# Patient Record
Sex: FEMALE | Race: WHITE | NOT HISPANIC OR LATINO | Employment: FULL TIME | URBAN - METROPOLITAN AREA
[De-identification: names, ages, dates, MRNs, and addresses within clinical notes are randomized per-mention and may not be internally consistent; named-entity substitution may affect disease eponyms.]

---
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
## 2025-01-23 ENCOUNTER — HOSPITAL ENCOUNTER (EMERGENCY)
Facility: HOSPITAL | Age: 20
Discharge: HOME | End: 2025-01-23
Payer: COMMERCIAL

## 2025-01-23 ENCOUNTER — APPOINTMENT (OUTPATIENT)
Dept: CT IMAGING | Facility: HOSPITAL | Age: 20
End: 2025-01-23
Payer: COMMERCIAL

## 2025-01-23 VITALS
SYSTOLIC BLOOD PRESSURE: 113 MMHG | DIASTOLIC BLOOD PRESSURE: 56 MMHG | TEMPERATURE: 98.3 F | OXYGEN SATURATION: 98 % | HEART RATE: 65 BPM | RESPIRATION RATE: 18 BRPM | WEIGHT: 170.3 LBS | BODY MASS INDEX: 29.08 KG/M2 | HEIGHT: 64 IN

## 2025-01-23 DIAGNOSIS — N12 PYELONEPHRITIS: ICD-10-CM

## 2025-01-23 LAB
ALBUMIN SERPL BCG-MCNC: 4.3 G/DL (ref 3.5–5.2)
ALBUMIN UR-MCNC: 20 MG/DL
ALP SERPL-CCNC: 110 U/L (ref 40–150)
ALT SERPL W P-5'-P-CCNC: 17 U/L (ref 0–50)
ANION GAP SERPL CALCULATED.3IONS-SCNC: 10 MMOL/L (ref 7–15)
APPEARANCE UR: CLEAR
AST SERPL W P-5'-P-CCNC: 18 U/L (ref 0–35)
BILIRUB SERPL-MCNC: 0.8 MG/DL
BILIRUB UR QL STRIP: NEGATIVE
BUN SERPL-MCNC: 11.8 MG/DL (ref 6–20)
CALCIUM SERPL-MCNC: 9.8 MG/DL (ref 8.8–10.4)
CHLORIDE SERPL-SCNC: 105 MMOL/L (ref 98–107)
COLOR UR AUTO: YELLOW
CREAT SERPL-MCNC: 0.85 MG/DL (ref 0.51–0.95)
EGFRCR SERPLBLD CKD-EPI 2021: >90 ML/MIN/1.73M2
ERYTHROCYTE [DISTWIDTH] IN BLOOD BY AUTOMATED COUNT: 13.2 % (ref 10–15)
GLUCOSE SERPL-MCNC: 95 MG/DL (ref 70–99)
GLUCOSE UR STRIP-MCNC: NEGATIVE MG/DL
HCG SERPL QL: NEGATIVE
HCO3 SERPL-SCNC: 24 MMOL/L (ref 22–29)
HCT VFR BLD AUTO: 40.3 % (ref 35–47)
HGB BLD-MCNC: 13.1 G/DL (ref 11.7–15.7)
HGB UR QL STRIP: NEGATIVE
HOLD SPECIMEN: NORMAL
KETONES UR STRIP-MCNC: NEGATIVE MG/DL
LEUKOCYTE ESTERASE UR QL STRIP: ABNORMAL
MCH RBC QN AUTO: 28.1 PG (ref 26.5–33)
MCHC RBC AUTO-ENTMCNC: 32.5 G/DL (ref 31.5–36.5)
MCV RBC AUTO: 87 FL (ref 78–100)
NITRATE UR QL: NEGATIVE
PH UR STRIP: 6.5 [PH] (ref 5–7)
PLATELET # BLD AUTO: 304 10E3/UL (ref 150–450)
POTASSIUM SERPL-SCNC: 3.9 MMOL/L (ref 3.4–5.3)
PROT SERPL-MCNC: 7.9 G/DL (ref 6.4–8.3)
RBC # BLD AUTO: 4.66 10E6/UL (ref 3.8–5.2)
RBC URINE: 1 /HPF
SODIUM SERPL-SCNC: 139 MMOL/L (ref 135–145)
SP GR UR STRIP: 1.03 (ref 1–1.03)
SQUAMOUS EPITHELIAL: 7 /HPF
UROBILINOGEN UR STRIP-MCNC: <2 MG/DL
WBC # BLD AUTO: 8.3 10E3/UL (ref 4–11)
WBC URINE: 4 /HPF

## 2025-01-23 PROCEDURE — 82435 ASSAY OF BLOOD CHLORIDE: CPT

## 2025-01-23 PROCEDURE — 36415 COLL VENOUS BLD VENIPUNCTURE: CPT | Performed by: STUDENT IN AN ORGANIZED HEALTH CARE EDUCATION/TRAINING PROGRAM

## 2025-01-23 PROCEDURE — 84155 ASSAY OF PROTEIN SERUM: CPT

## 2025-01-23 PROCEDURE — 250N000011 HC RX IP 250 OP 636

## 2025-01-23 PROCEDURE — 96375 TX/PRO/DX INJ NEW DRUG ADDON: CPT

## 2025-01-23 PROCEDURE — 82040 ASSAY OF SERUM ALBUMIN: CPT

## 2025-01-23 PROCEDURE — 96374 THER/PROPH/DIAG INJ IV PUSH: CPT | Mod: 59

## 2025-01-23 PROCEDURE — 84703 CHORIONIC GONADOTROPIN ASSAY: CPT

## 2025-01-23 PROCEDURE — 99285 EMERGENCY DEPT VISIT HI MDM: CPT | Mod: 25

## 2025-01-23 PROCEDURE — 74177 CT ABD & PELVIS W/CONTRAST: CPT

## 2025-01-23 PROCEDURE — 81001 URINALYSIS AUTO W/SCOPE: CPT

## 2025-01-23 PROCEDURE — 85014 HEMATOCRIT: CPT

## 2025-01-23 PROCEDURE — 85041 AUTOMATED RBC COUNT: CPT

## 2025-01-23 RX ORDER — IOPAMIDOL 755 MG/ML
83 INJECTION, SOLUTION INTRAVASCULAR ONCE
Status: COMPLETED | OUTPATIENT
Start: 2025-01-23 | End: 2025-01-23

## 2025-01-23 RX ORDER — ONDANSETRON 2 MG/ML
4 INJECTION INTRAMUSCULAR; INTRAVENOUS ONCE
Status: COMPLETED | OUTPATIENT
Start: 2025-01-23 | End: 2025-01-23

## 2025-01-23 RX ORDER — CEFDINIR 300 MG/1
300 CAPSULE ORAL 2 TIMES DAILY
Qty: 14 CAPSULE | Refills: 0 | Status: SHIPPED | OUTPATIENT
Start: 2025-01-23 | End: 2025-01-30

## 2025-01-23 RX ORDER — ONDANSETRON 4 MG/1
4 TABLET, ORALLY DISINTEGRATING ORAL EVERY 6 HOURS PRN
Qty: 12 TABLET | Refills: 0 | Status: SHIPPED | OUTPATIENT
Start: 2025-01-23

## 2025-01-23 RX ORDER — KETOROLAC TROMETHAMINE 15 MG/ML
15 INJECTION, SOLUTION INTRAMUSCULAR; INTRAVENOUS ONCE
Status: COMPLETED | OUTPATIENT
Start: 2025-01-23 | End: 2025-01-23

## 2025-01-23 RX ADMIN — ONDANSETRON 4 MG: 2 INJECTION INTRAMUSCULAR; INTRAVENOUS at 10:25

## 2025-01-23 RX ADMIN — IOPAMIDOL 83 ML: 755 INJECTION, SOLUTION INTRAVENOUS at 12:15

## 2025-01-23 RX ADMIN — KETOROLAC TROMETHAMINE 15 MG: 15 INJECTION, SOLUTION INTRAMUSCULAR; INTRAVENOUS at 10:26

## 2025-01-23 ASSESSMENT — COLUMBIA-SUICIDE SEVERITY RATING SCALE - C-SSRS
2. HAVE YOU ACTUALLY HAD ANY THOUGHTS OF KILLING YOURSELF IN THE PAST MONTH?: NO
1. IN THE PAST MONTH, HAVE YOU WISHED YOU WERE DEAD OR WISHED YOU COULD GO TO SLEEP AND NOT WAKE UP?: NO
6. HAVE YOU EVER DONE ANYTHING, STARTED TO DO ANYTHING, OR PREPARED TO DO ANYTHING TO END YOUR LIFE?: YES

## 2025-01-23 ASSESSMENT — ACTIVITIES OF DAILY LIVING (ADL)
ADLS_ACUITY_SCORE: 43

## 2025-01-23 NOTE — ED TRIAGE NOTES
Patient ambulates to triage with boyfriend.  Diagnosed with a UTI on Monday and has been taking abx as prescribed.  Still continues to have bilateral flank pain with lower abdominal pain.  Still noting some blood in the urine.      Not taking anything at home for the pain.

## 2025-01-23 NOTE — ED PROVIDER NOTES
Emergency Department Encounter   NAME: Stewart Moreira  AGE: 19 year old female  YOB: 2005  MRN: [id number]    PCP: No Ref-Primary, Physician  ED PROVIDER: Gladis Huggins PA-C    Evaluation Date & Time:   1/23/2025  9:42 AM    CHIEF COMPLAINT:  Abdominal Pain and Flank Pain      Impression and Plan   MDM: 19-year-old female with no pertinent history presents for evaluation of abdominal and flank pain.  Has been ongoing for 4 days.  Diagnosed with UTI on 1/20/2025 and started on Macrobid which she has been taking.  Nauseous but no vomiting.  No fevers.  On arrival here patient is vitally stable.  Afebrile.  On my exam patient is in no acute distress.  She has diffuse lower abdominal tenderness to palpation of bilateral CVA tenderness.  Differential includes appendicitis, pyelonephritis, ureteral stone.  We discussed plans for labs, CT, Toradol/Zofran for symptoms at this time which patient is agreeable to.    On chart review:   Negative pregnancy test 1/20/2025.  UA 1/20/2025 positive for leukocyte esterase, nitrates, blood, many bacteria -samples also contaminated. -- no culture available.    Lab work here is very reassuring without any leukocytosis or anemia.  No concerning electrolyte abnormality.  No ROBYN.  No LFT abnormality.  Negative pregnancy test.  Repeat UA here is not overly convincing for infection, but she has been on Macrobid.  CT with findings consistent with cystitis.  No stone or CT findings of pyelonephritis.  I reassessed patient she is feeling much better after Toradol and Zofran.  Discussed reassuring workup.  Though given her persistent nausea and flank pain we discussed discontinuing her Macrobid and starting Omnicef to cover for possible developing pyelonephritis.  Patient is agreeable to this plan.  I prescribed her Zofran and Omnicef.  She is going to follow-up with her primary care provider.  We reviewed strict return precautions, signs of worsening infection, and patient was  discharged home in stable condition.         Medical Decision Making  Obtained supplemental history:Supplemental history obtained?: Family Member/Significant Other  Reviewed external records: External records reviewed?: Documented in chart  Care impacted by chronic illness:Documented in Chart  Did you consider but not order tests?: Work up considered but not performed and documented in chart, if applicable  Did you interpret images independently?: Independent interpretation of ECG and images noted in documentation, when applicable.  Consultation discussion with other provider:Did you involve another provider (consultant, , pharmacy, etc.)?: No  Discharge. I prescribed additional prescription strength medication(s) as charted. N/A.    MIPS: Not Applicable        FINAL IMPRESSION:    ICD-10-CM    1. Pyelonephritis  N12             MEDICATIONS GIVEN IN THE EMERGENCY DEPARTMENT:  Medications   ondansetron (ZOFRAN) injection 4 mg (4 mg Intravenous $Given 1/23/25 1025)   ketorolac (TORADOL) injection 15 mg (15 mg Intravenous $Given 1/23/25 1026)   iopamidol (ISOVUE-370) solution 83 mL (83 mLs Intravenous $Given 1/23/25 1215)         NEW PRESCRIPTIONS STARTED AT TODAY'S ED VISIT:  Discharge Medication List as of 1/23/2025  1:13 PM        START taking these medications    Details   cefdinir (OMNICEF) 300 MG capsule Take 1 capsule (300 mg) by mouth 2 times daily for 7 days., Disp-14 capsule, R-0, E-Prescribe      ondansetron (ZOFRAN ODT) 4 MG ODT tab Take 1 tablet (4 mg) by mouth every 6 hours as needed for nausea or vomiting., Disp-12 tablet, R-0, E-Prescribe               HPI   Patient information was obtained from: patient   Use of Intrepreter: N/A     Stewart Moreira is a 19 year old female with a pertinent history of MDD & DARIA who presents to the ED by car for evaluation of abdominal pain and flank pain.  Patient was seen by primary care 4 days ago and diagnosed with UTI.  She was started on Macrobid which she has  "been taking since then.  She notes continued pink-tinged urine, dysuria, suprapubic pain, and bilateral flank pain.  She endorses nausea but no vomiting.  No diarrhea, black/bloody stools, fever.  LMP was 2 weeks ago.      REVIEW OF SYSTEMS:  Pertinent positive and negative symptoms per HPI.       Physical Exam     First Vitals:  Patient Vitals for the past 24 hrs:   BP Temp Temp src Pulse Resp SpO2 Height Weight   01/23/25 1157 -- -- -- 65 -- 98 % -- --   01/23/25 1156 -- -- -- 67 -- 98 % -- --   01/23/25 1155 -- -- -- 69 -- 98 % -- --   01/23/25 1154 -- -- -- 67 -- 96 % -- --   01/23/25 1153 -- -- -- 65 -- 98 % -- --   01/23/25 1152 -- -- -- 70 -- 98 % -- --   01/23/25 1151 -- -- -- 65 -- 97 % -- --   01/23/25 1150 113/56 -- -- 67 -- 98 % -- --   01/23/25 1028 -- -- -- 80 -- 99 % -- --   01/23/25 1027 -- -- -- 75 -- 95 % -- --   01/23/25 1026 -- -- -- 73 -- 100 % -- --   01/23/25 1025 -- -- -- 78 -- 99 % -- --   01/23/25 1024 -- -- -- 74 -- 100 % -- --   01/23/25 1023 -- -- -- 78 -- 99 % -- --   01/23/25 1022 -- -- -- 78 -- 99 % -- --   01/23/25 1021 -- -- -- 79 -- 99 % -- --   01/23/25 1020 -- -- -- 83 -- 99 % -- --   01/23/25 1019 -- -- -- 81 -- 100 % -- --   01/23/25 1018 -- -- -- 78 -- 99 % -- --   01/23/25 1017 -- -- -- 78 -- 100 % -- --   01/23/25 1016 -- -- -- 82 -- 99 % -- --   01/23/25 1015 116/69 -- -- 84 -- 99 % -- --   01/23/25 0947 -- -- -- 86 -- 98 % -- --   01/23/25 0946 115/72 -- -- 91 -- 98 % -- --   01/23/25 0938 132/77 98.3  F (36.8  C) Oral 97 18 97 % 1.626 m (5' 4\") 77.2 kg (170 lb 4.8 oz)       PHYSICAL EXAM:   General Appearance:  Alert, cooperative, no distress, appears stated age  HENT: Normocephalic without obvious deformity, atraumatic. Mucous membranes moist   Eyes: Conjunctiva clear, Lids normal. No discharge.   Respiratory: No distress. Lungs clear to ausculation bilaterally. No wheezes, rhonchi or stridor  Cardiovascular: Regular rate and rhythm, no murmur. Normal cap refill. No " peripheral edema  GI: Suprapubic, right lower quadrant, left lower quadrant tenderness to palpation without any associated rigidity, rebound, guarding, or distention.  Otherwise abdomen is soft and nontender.  : Bilateral CVA tenderness  Musculoskeletal: Moving all extremities. No gross deformities  Integument: Warm, dry, no rashes or lesions  Neurologic: Alert and orientated x3.   Psych: Normal mood and affect      Results     LAB:  All pertinent labs reviewed and interpreted  Labs Ordered and Resulted from Time of ED Arrival to Time of ED Departure   ROUTINE UA WITH MICROSCOPIC REFLEX TO CULTURE - Abnormal       Result Value    Color Urine Yellow      Appearance Urine Clear      Glucose Urine Negative      Bilirubin Urine Negative      Ketones Urine Negative      Specific Gravity Urine 1.031 (*)     Blood Urine Negative      pH Urine 6.5      Protein Albumin Urine 20 (*)     Urobilinogen Urine <2.0      Nitrite Urine Negative      Leukocyte Esterase Urine 25 Fadi/uL (*)     RBC Urine 1      WBC Urine 4      Squamous Epithelials Urine 7 (*)    CBC WITH PLATELETS - Normal    WBC Count 8.3      RBC Count 4.66      Hemoglobin 13.1      Hematocrit 40.3      MCV 87      MCH 28.1      MCHC 32.5      RDW 13.2      Platelet Count 304     COMPREHENSIVE METABOLIC PANEL - Normal    Sodium 139      Potassium 3.9      Carbon Dioxide (CO2) 24      Anion Gap 10      Urea Nitrogen 11.8      Creatinine 0.85      GFR Estimate >90      Calcium 9.8      Chloride 105      Glucose 95      Alkaline Phosphatase 110      AST 18      ALT 17      Protein Total 7.9      Albumin 4.3      Bilirubin Total 0.8     HCG QUALITATIVE PREGNANCY - Normal    hCG Serum Qualitative Negative         RADIOLOGY:  CT Abdomen Pelvis w Contrast   Final Result   IMPRESSION:    1.  The bladder is incompletely distended accentuating wall thickening. Superimposed cystitis is also the differential.   2.  Otherwise, no additional findings to explain symptoms. No  hydronephrosis or CT evidence of pyelonephritis.        .       Gladis Huggins PA-C   Emergency Medicine   [facility] EMERGENCY DEPARTMENT       Gladis Huggins PA-C  01/23/25 1483

## 2025-01-23 NOTE — DISCHARGE INSTRUCTIONS
You were seen in the emergency department for evaluation of urinary symptoms and flank pain.  Thankfully there is no kidney stone or appendicitis on your CT scan.  I am concerned that your urine infection may be traveling towards her kidneys, but thankfully there is no evidence of kidney infection on your CT scan today.    Please stop taking the antibiotics that you were prescribed by your primary care provider called Macrobid. START taking cefdinir, Omnicef, as prescribed 2 times a day for 7 days.  Use the Zofran as needed for nausea and vomiting.  Use Tylenol and ibuprofen as needed for discomfort.    You may take 600 mg of ibuprofen (Advil) every 6 hours and 650 mg acetaminophen (Tylenol) every 6 hours for pain. Do not take more than 3200 mg of ibuprofen (Advil) in 24 hours. Do not take more than 3000 mg of acetaminophen (Tylenol) in 24 hours. You may take this much for 1-3 days, then only use as needed.     Schedule follow-up appointment your primary care provider.    Return to the emergency department for uncontrollable pain, uncontrollable vomiting, fever, or any other concerning symptoms.  
detailed exam

## 2025-02-08 ENCOUNTER — HOSPITAL ENCOUNTER (EMERGENCY)
Facility: HOSPITAL | Age: 20
Discharge: HOME | End: 2025-02-08
Attending: EMERGENCY MEDICINE | Admitting: EMERGENCY MEDICINE
Payer: COMMERCIAL

## 2025-02-08 VITALS
RESPIRATION RATE: 16 BRPM | HEIGHT: 64 IN | SYSTOLIC BLOOD PRESSURE: 121 MMHG | HEART RATE: 66 BPM | WEIGHT: 173.3 LBS | OXYGEN SATURATION: 100 % | DIASTOLIC BLOOD PRESSURE: 73 MMHG | TEMPERATURE: 98.1 F | BODY MASS INDEX: 29.59 KG/M2

## 2025-02-08 DIAGNOSIS — S06.0X1A CONCUSSION WITH LOSS OF CONSCIOUSNESS OF 30 MINUTES OR LESS, INITIAL ENCOUNTER: ICD-10-CM

## 2025-02-08 PROCEDURE — 99282 EMERGENCY DEPT VISIT SF MDM: CPT

## 2025-02-08 ASSESSMENT — ACTIVITIES OF DAILY LIVING (ADL): ADLS_ACUITY_SCORE: 41

## 2025-02-08 ASSESSMENT — COLUMBIA-SUICIDE SEVERITY RATING SCALE - C-SSRS
4. HAVE YOU HAD THESE THOUGHTS AND HAD SOME INTENTION OF ACTING ON THEM?: NO
6. HAVE YOU EVER DONE ANYTHING, STARTED TO DO ANYTHING, OR PREPARED TO DO ANYTHING TO END YOUR LIFE?: YES
3. HAVE YOU BEEN THINKING ABOUT HOW YOU MIGHT KILL YOURSELF?: NO
2. HAVE YOU ACTUALLY HAD ANY THOUGHTS OF KILLING YOURSELF IN THE PAST MONTH?: YES
1. IN THE PAST MONTH, HAVE YOU WISHED YOU WERE DEAD OR WISHED YOU COULD GO TO SLEEP AND NOT WAKE UP?: YES
5. HAVE YOU STARTED TO WORK OUT OR WORKED OUT THE DETAILS OF HOW TO KILL YOURSELF? DO YOU INTEND TO CARRY OUT THIS PLAN?: NO

## 2025-02-08 NOTE — Clinical Note
Stewart Gottlieblamar was seen and treated in our emergency department on 2/8/2025.  She may return to work on 02/13/2025.       If you have any questions or concerns, please don't hesitate to call.      Nancy Olivares MD

## 2025-02-09 NOTE — ED TRIAGE NOTES
Patient reports that around noon today she was messing around with a friend and hit the left side of her face on her friends head. Patient has a small bruise on her left eyelid. Patient reports pain 3 out of 10 and did not take anything for pain. She also reports feeling tired and dizzy. Alert and oriented x 4. Patient also reports that she has had thoughts of wishing she was dead and thought os suicide but has no plan. Patient reports she does not have plan on hurting herself and does have an outpatient therapist. She does not feel she nneds mental health treatment at this time. Provider updated.      Triage Assessment (Adult)       Row Name 02/08/25 1941          Triage Assessment    Airway WDL WDL        Respiratory WDL    Respiratory WDL WDL        Peripheral/Neurovascular WDL    Peripheral Neurovascular WDL WDL        Cognitive/Neuro/Behavioral WDL    Cognitive/Neuro/Behavioral WDL X        Pupils (CN II)    Pupil PERRLA yes     Pupil Size Left 3 mm     Pupil Size Right 3 mm        Beason Coma Scale    Best Eye Response 4-->(E4) spontaneous     Best Motor Response 6-->(M6) obeys commands     Best Verbal Response 5-->(V5) oriented     Johanna Coma Scale Score 15

## 2025-02-09 NOTE — DISCHARGE INSTRUCTIONS
You were seen in the Emergency Department today for evaluation of a concussion.  Take Tylenol and ibuprofen as needed for symptoms.  Follow up with your primary care physician to ensure resolution of symptoms. Return if you have new or worsening symptoms.

## 2025-02-09 NOTE — ED PROVIDER NOTES
EMERGENCY DEPARTMENT ENCOUNTER      NAME: Stewart Moreira  AGE: 19 year old female  YOB: 2005  MRN: [id number]  EVALUATION DATE & TIME: 2/8/2025  7:47 PM    PCP: No Ref-Primary, Physician    ED PROVIDER: Nancy Olivares M.D.      Chief Complaint   Patient presents with    Head Injury         FINAL IMPRESSION:  1. Concussion with loss of consciousness of 30 minutes or less, initial encounter        ED COURSE & MEDICAL DECISION MAKING:    Pertinent Labs & Imaging studies reviewed. (See chart for details)  ED Course as of 02/08/25 2006   Sat Feb 08, 2025 2003 Patient is a pleasant 19-year-old female who comes in today for evaluation after she bumped heads with her friend earlier today around 12:30 PM.  She said they were roughhousing and they hit heads.  She was hit over the left brow.  She said that she may have had immediate loss of consciousness.  Now she is feeling little dizzy and tired but no nausea or vomiting.  She has had concussions before but never lost consciousness.  She is normally pretty healthy.  She does have a small amount of swelling over her left brow with no laceration.  She has no neck tenderness.  She has no other areas of swelling.  She has no hemotympanums.  Neuroexam is normal with normal finger-to-nose and normal extraocular movements as well has normal pupillary response.  Sensation and motor intact bilaterally.  She is not on any blood thinners.  I think she has a concussion.  I do not think her symptoms at this point warrant CT imaging.  I discussed all this with her and she is in agreement with the plan.  There was some concern about some suicidal ideation but I discussed this with her and she has good resources as an outpatient and does not have any concern about her safety right now.  We will discharge her home with concussion precautions.       Medical Decision Making    History:  Supplemental history from: None  External Record(s) reviewed: I reviewed patient's clinic  visit from 1/20/2025.  At that time she was being seen for frequent urination and cloudy urine for 2 days.  Urinalysis came back nitrite positive and leukocyte esterase positive and patient was treated with nitrofurantoin.    Work Up:  Emergent/Severe conditions considered and evaluated for: Concussion, intracranial hemorrhage  I independently reviewed and interpreted none.   In addition to work up documented, I considered the following work up: Considered head CT but per [place] CT head rules patient does not meet criteria given that she has no blood thinners, no seizure, normal GCS of 15, no depressed skull fracture, no signs of a basilar skull fracture, no vomiting, age 19, nondangerous mechanism, and no retrograde amnesia to the event of greater than 30 minutes.  Medications given that require intensive monitoring for toxicity: None    External consultation:  Discussion of management with another provider: None    Complicating factors:  Care impacted by chronic illness: None    Disposition considerations: Discharge  Prescriptions considered/prescribed: None        At the conclusion of the encounter I discussed  the results of all of the tests and the disposition with patient.   All questions were answered.  The patient acknowledged understanding and was involved in the decision making regarding the overall care plan.      I discussed with patient the utility, limitations and findings of the exam/interventions/studies done during this visit as well as the list of differential diagnosis and symptoms to monitor/return to ER for.  Additional verbal discharge instructions were provided.     MEDICATIONS GIVEN IN THE EMERGENCY:  Medications - No data to display    NEW PRESCRIPTIONS STARTED AT TODAY'S ER VISIT  New Prescriptions    No medications on file          =================================================================    HPI    Triage Note: Patient reports that around noon today she was messing around with a  friend and hit the left side of her face on her friends head. Patient has a small bruise on her left eyelid. Patient reports pain 3 out of 10 and did not take anything for pain. She also reports feeling tired and dizzy. Alert and oriented x 4. Patient also reports that she has had thoughts of wishing she was dead and thought os suicide but has no plan. Patient reports she does not have plan on hurting herself and does have an outpatient therapist. She does not feel she nneds mental health treatment at this time. Provider updated.      Triage Assessment (Adult)       Row Name 02/08/25 1941          Triage Assessment    Airway WDL WDL        Respiratory WDL    Respiratory WDL WDL        Peripheral/Neurovascular WDL    Peripheral Neurovascular WDL WDL        Cognitive/Neuro/Behavioral WDL    Cognitive/Neuro/Behavioral WDL X        Pupils (CN II)    Pupil PERRLA yes     Pupil Size Left 3 mm     Pupil Size Right 3 mm        Johanna Coma Scale    Best Eye Response 4-->(E4) spontaneous     Best Motor Response 6-->(M6) obeys commands     Best Verbal Response 5-->(V5) oriented     Johanna Coma Scale Score 15                   Use of : None      Stewart Moreiar is a 19 year old female who presents for evaluation of headache and some dizziness after she bumped heads with a friend about 7-1/2 hours prior to arrival.    PAST MEDICAL HISTORY:  No past medical history on file.    PAST SURGICAL HISTORY:  No past surgical history on file.    CURRENT MEDICATIONS:    No current facility-administered medications for this encounter.    Current Outpatient Medications:     ondansetron (ZOFRAN ODT) 4 MG ODT tab, Take 1 tablet (4 mg) by mouth every 6 hours as needed for nausea or vomiting., Disp: 12 tablet, Rfl: 0    ALLERGIES:  No Known Allergies    FAMILY HISTORY:  No family history on file.    SOCIAL HISTORY:   Social History     Socioeconomic History    Marital status: Single     Social Drivers of Health     Food Insecurity:  "No Food Insecurity (10/4/2024)    Received from [facility]    Hunger Vital Sign     Worried About Running Out of Food in the Last Year: Never true     Ran Out of Food in the Last Year: Never true   Transportation Needs: No Transportation Needs (10/4/2024)    [facility]     In the past 12 months, has lack of transportation kept you from medical appointments or from getting medications?: No     In the past 12 months, has lack of transportation kept you from meetings, work, or from getting things needed for daily living?: No   Housing Stability: Low Risk  (10/4/2024)    Received from [facility]    Housing Stability Vital Sign     Unable to Pay for Housing in the Last Year: No     Number of Times Moved in the Last Year: 0     Homeless in the Last Year: No       PHYSICAL EXAM    VITAL SIGNS: /73   Pulse 66   Temp 98.1  F (36.7  C) (Oral)   Resp 16   Ht 1.626 m (5' 4\")   Wt 78.6 kg (173 lb 4.8 oz)   LMP 01/13/2025 (Approximate)   SpO2 100%   BMI 29.75 kg/m     GENERAL: Awake, alert, answering questions appropriately, no acute distress, small amount of swelling to the left brow without laceration.  No step-off to the orbit, pupils equally round reactive to light.  Extraocular movements intact with no difficulty with tracking.  No signs of entrapment of intraocular muscles, no palpable hematoma to the rest of the scalp.  No hemotympanum.  No cervical spine tenderness.  Equal  strength bilaterally.  Finger-nose test normal bilaterally.  Sensation to upper and lower extremities equal bilaterally.  Patient able to stand up without difficulty.  Able to close eyes and hold there for 5 seconds without difficulty.  No facial droop.  GCS 15  SPEECH:  Easy to understand speech, Normal volume and rafa  PULMONARY: No respiratory distress  CARDIOVASCULAR: Regular rate and rhythm, Distal pulses present and normal.  EXTREMITIES: No lower extremity edema.  PSYCH: " Normal mood and affect     Nancy Olivares M.D.  Emergency Medicine  [facility] EMERGENCY DEPARTMENT  [facility] [facility] [address] [phone]  Dept: [phone]       Nancy Olivares MD  02/08/25 2010

## 2025-03-04 ENCOUNTER — HOSPITAL ENCOUNTER (EMERGENCY)
Facility: HOSPITAL | Age: 20
Discharge: HOME | End: 2025-03-04
Attending: STUDENT IN AN ORGANIZED HEALTH CARE EDUCATION/TRAINING PROGRAM | Admitting: STUDENT IN AN ORGANIZED HEALTH CARE EDUCATION/TRAINING PROGRAM
Payer: COMMERCIAL

## 2025-03-04 VITALS
SYSTOLIC BLOOD PRESSURE: 108 MMHG | RESPIRATION RATE: 14 BRPM | OXYGEN SATURATION: 100 % | HEART RATE: 80 BPM | HEIGHT: 64 IN | WEIGHT: 170 LBS | TEMPERATURE: 98.4 F | DIASTOLIC BLOOD PRESSURE: 56 MMHG | BODY MASS INDEX: 29.02 KG/M2

## 2025-03-04 DIAGNOSIS — R45.851 SUICIDAL THOUGHTS: ICD-10-CM

## 2025-03-04 DIAGNOSIS — R44.0 AUDITORY HALLUCINATIONS: ICD-10-CM

## 2025-03-04 DIAGNOSIS — R51.9 ACUTE NONINTRACTABLE HEADACHE, UNSPECIFIED HEADACHE TYPE: ICD-10-CM

## 2025-03-04 PROBLEM — F41.1 GAD (GENERALIZED ANXIETY DISORDER): Status: ACTIVE | Noted: 2024-07-02

## 2025-03-04 PROBLEM — F32.3 MAJOR DEPRESSIVE DISORDER, SINGLE EPISODE, SEVERE WITH PSYCHOTIC FEATURES (H): Status: ACTIVE | Noted: 2024-07-02

## 2025-03-04 LAB
ANION GAP SERPL CALCULATED.3IONS-SCNC: 9 MMOL/L (ref 7–15)
BASOPHILS # BLD AUTO: 0.1 10E3/UL (ref 0–0.2)
BASOPHILS NFR BLD AUTO: 1 %
BUN SERPL-MCNC: 12 MG/DL (ref 6–20)
CALCIUM SERPL-MCNC: 9.7 MG/DL (ref 8.8–10.4)
CHLORIDE SERPL-SCNC: 105 MMOL/L (ref 98–107)
CREAT SERPL-MCNC: 0.91 MG/DL (ref 0.51–0.95)
EGFRCR SERPLBLD CKD-EPI 2021: >90 ML/MIN/1.73M2
EOSINOPHIL # BLD AUTO: 0.1 10E3/UL (ref 0–0.7)
EOSINOPHIL NFR BLD AUTO: 2 %
ERYTHROCYTE [DISTWIDTH] IN BLOOD BY AUTOMATED COUNT: 12.5 % (ref 10–15)
ETHANOL SERPL-MCNC: <0.01 G/DL
GLUCOSE SERPL-MCNC: 99 MG/DL (ref 70–99)
HCO3 SERPL-SCNC: 24 MMOL/L (ref 22–29)
HCT VFR BLD AUTO: 40.4 % (ref 35–47)
HGB BLD-MCNC: 13.3 G/DL (ref 11.7–15.7)
IMM GRANULOCYTES # BLD: 0 10E3/UL
IMM GRANULOCYTES NFR BLD: 0 %
LYMPHOCYTES # BLD AUTO: 1.8 10E3/UL (ref 0.8–5.3)
LYMPHOCYTES NFR BLD AUTO: 25 %
MCH RBC QN AUTO: 27.8 PG (ref 26.5–33)
MCHC RBC AUTO-ENTMCNC: 32.9 G/DL (ref 31.5–36.5)
MCV RBC AUTO: 85 FL (ref 78–100)
MONOCYTES # BLD AUTO: 0.3 10E3/UL (ref 0–1.3)
MONOCYTES NFR BLD AUTO: 5 %
NEUTROPHILS # BLD AUTO: 4.8 10E3/UL (ref 1.6–8.3)
NEUTROPHILS NFR BLD AUTO: 67 %
NRBC # BLD AUTO: 0 10E3/UL
NRBC BLD AUTO-RTO: 0 /100
PLATELET # BLD AUTO: 284 10E3/UL (ref 150–450)
POTASSIUM SERPL-SCNC: 3.6 MMOL/L (ref 3.4–5.3)
RBC # BLD AUTO: 4.78 10E6/UL (ref 3.8–5.2)
SARS-COV-2 RNA RESP QL NAA+PROBE: NEGATIVE
SODIUM SERPL-SCNC: 138 MMOL/L (ref 135–145)
WBC # BLD AUTO: 7.1 10E3/UL (ref 4–11)

## 2025-03-04 PROCEDURE — 99285 EMERGENCY DEPT VISIT HI MDM: CPT | Mod: 25

## 2025-03-04 PROCEDURE — 82565 ASSAY OF CREATININE: CPT | Performed by: STUDENT IN AN ORGANIZED HEALTH CARE EDUCATION/TRAINING PROGRAM

## 2025-03-04 PROCEDURE — 85048 AUTOMATED LEUKOCYTE COUNT: CPT | Performed by: STUDENT IN AN ORGANIZED HEALTH CARE EDUCATION/TRAINING PROGRAM

## 2025-03-04 PROCEDURE — 85004 AUTOMATED DIFF WBC COUNT: CPT | Performed by: STUDENT IN AN ORGANIZED HEALTH CARE EDUCATION/TRAINING PROGRAM

## 2025-03-04 PROCEDURE — 250N000013 HC RX MED GY IP 250 OP 250 PS 637: Performed by: STUDENT IN AN ORGANIZED HEALTH CARE EDUCATION/TRAINING PROGRAM

## 2025-03-04 PROCEDURE — 82077 ASSAY SPEC XCP UR&BREATH IA: CPT | Performed by: STUDENT IN AN ORGANIZED HEALTH CARE EDUCATION/TRAINING PROGRAM

## 2025-03-04 PROCEDURE — 258N000003 HC RX IP 258 OP 636: Performed by: STUDENT IN AN ORGANIZED HEALTH CARE EDUCATION/TRAINING PROGRAM

## 2025-03-04 PROCEDURE — 36415 COLL VENOUS BLD VENIPUNCTURE: CPT | Performed by: STUDENT IN AN ORGANIZED HEALTH CARE EDUCATION/TRAINING PROGRAM

## 2025-03-04 PROCEDURE — 96375 TX/PRO/DX INJ NEW DRUG ADDON: CPT

## 2025-03-04 PROCEDURE — 96361 HYDRATE IV INFUSION ADD-ON: CPT

## 2025-03-04 PROCEDURE — 87635 SARS-COV-2 COVID-19 AMP PRB: CPT | Performed by: STUDENT IN AN ORGANIZED HEALTH CARE EDUCATION/TRAINING PROGRAM

## 2025-03-04 PROCEDURE — 80048 BASIC METABOLIC PNL TOTAL CA: CPT | Performed by: STUDENT IN AN ORGANIZED HEALTH CARE EDUCATION/TRAINING PROGRAM

## 2025-03-04 PROCEDURE — 96374 THER/PROPH/DIAG INJ IV PUSH: CPT

## 2025-03-04 PROCEDURE — 250N000011 HC RX IP 250 OP 636: Performed by: STUDENT IN AN ORGANIZED HEALTH CARE EDUCATION/TRAINING PROGRAM

## 2025-03-04 RX ORDER — SERTRALINE HYDROCHLORIDE 25 MG/1
25 TABLET, FILM COATED ORAL DAILY
Status: DISCONTINUED | OUTPATIENT
Start: 2025-03-04 | End: 2025-03-04

## 2025-03-04 RX ORDER — METOCLOPRAMIDE HYDROCHLORIDE 5 MG/ML
10 INJECTION INTRAMUSCULAR; INTRAVENOUS ONCE
Status: COMPLETED | OUTPATIENT
Start: 2025-03-04 | End: 2025-03-04

## 2025-03-04 RX ORDER — KETOROLAC TROMETHAMINE 15 MG/ML
15 INJECTION, SOLUTION INTRAMUSCULAR; INTRAVENOUS ONCE
Status: COMPLETED | OUTPATIENT
Start: 2025-03-04 | End: 2025-03-04

## 2025-03-04 RX ORDER — DIPHENHYDRAMINE HYDROCHLORIDE 50 MG/ML
50 INJECTION, SOLUTION INTRAMUSCULAR; INTRAVENOUS ONCE
Status: COMPLETED | OUTPATIENT
Start: 2025-03-04 | End: 2025-03-04

## 2025-03-04 RX ORDER — SERTRALINE HYDROCHLORIDE 25 MG/1
25 TABLET, FILM COATED ORAL AT BEDTIME
Status: DISCONTINUED | OUTPATIENT
Start: 2025-03-04 | End: 2025-03-04 | Stop reason: HOSPADM

## 2025-03-04 RX ORDER — ACETAMINOPHEN 325 MG/1
975 TABLET ORAL ONCE
Status: COMPLETED | OUTPATIENT
Start: 2025-03-04 | End: 2025-03-04

## 2025-03-04 RX ADMIN — ACETAMINOPHEN 975 MG: 325 TABLET ORAL at 06:31

## 2025-03-04 RX ADMIN — METOCLOPRAMIDE 10 MG: 5 INJECTION, SOLUTION INTRAMUSCULAR; INTRAVENOUS at 06:34

## 2025-03-04 RX ADMIN — KETOROLAC TROMETHAMINE 15 MG: 15 INJECTION, SOLUTION INTRAMUSCULAR; INTRAVENOUS at 06:32

## 2025-03-04 RX ADMIN — DIPHENHYDRAMINE HYDROCHLORIDE 50 MG: 50 INJECTION, SOLUTION INTRAMUSCULAR; INTRAVENOUS at 06:34

## 2025-03-04 RX ADMIN — SODIUM CHLORIDE 1000 ML: 0.9 INJECTION, SOLUTION INTRAVENOUS at 06:26

## 2025-03-04 ASSESSMENT — COLUMBIA-SUICIDE SEVERITY RATING SCALE - C-SSRS
6. HAVE YOU EVER DONE ANYTHING, STARTED TO DO ANYTHING, OR PREPARED TO DO ANYTHING TO END YOUR LIFE?: YES
4. HAVE YOU HAD THESE THOUGHTS AND HAD SOME INTENTION OF ACTING ON THEM?: YES
5. HAVE YOU STARTED TO WORK OUT OR WORKED OUT THE DETAILS OF HOW TO KILL YOURSELF? DO YOU INTEND TO CARRY OUT THIS PLAN?: NO
2. HAVE YOU ACTUALLY HAD ANY THOUGHTS OF KILLING YOURSELF IN THE PAST MONTH?: YES
6. HAVE YOU EVER DONE ANYTHING, STARTED TO DO ANYTHING, OR PREPARED TO DO ANYTHING TO END YOUR LIFE?: YES
BASED ON RESPONSES TO C-SSRS QS 1-6, WHAT IS THE PATIENT'S OVERALL RISK RATING FOR SUICIDE: HIGH RISK
3. HAVE YOU BEEN THINKING ABOUT HOW YOU MIGHT KILL YOURSELF?: NO
5. HAVE YOU STARTED TO WORK OUT OR WORKED OUT THE DETAILS OF HOW TO KILL YOURSELF? DO YOU INTEND TO CARRY OUT THIS PLAN?: NO
1. IN THE PAST MONTH, HAVE YOU WISHED YOU WERE DEAD OR WISHED YOU COULD GO TO SLEEP AND NOT WAKE UP?: YES
2. HAVE YOU ACTUALLY HAD ANY THOUGHTS OF KILLING YOURSELF IN THE PAST MONTH?: YES
3. HAVE YOU BEEN THINKING ABOUT HOW YOU MIGHT KILL YOURSELF?: NO
4. HAVE YOU HAD THESE THOUGHTS AND HAD SOME INTENTION OF ACTING ON THEM?: YES
1. IN THE PAST MONTH, HAVE YOU WISHED YOU WERE DEAD OR WISHED YOU COULD GO TO SLEEP AND NOT WAKE UP?: YES

## 2025-03-04 ASSESSMENT — ACTIVITIES OF DAILY LIVING (ADL)
ADLS_ACUITY_SCORE: 41

## 2025-03-04 NOTE — PLAN OF CARE
Stewart Moreira  March 4, 2025  Plan of Care Hand-off Note     Patient Recommended Care Path: discharge    Clinical Substantiation:  After therapeutic assessment, intervention, and aftercare planning by ED care team and LM and in consultation with attending provider, the patient's circumstances and mental state were appropriate for outpatient management. It is the recommendation of this clinician that pt discharge with OP MH support. Currently the pt is not presenting as an acute risk to self or others due to the following factors: Pt reports they present to ED for headache, thought to sef-harm and auditory hallucinations earlier in the morning. Pt states symptoms have improved overall and they developed a safety and would like to discharge home and follow up with their current healthcare providers.    Goals for crisis stabilization:  Pt states they would call their providers to schedule appointment for as soon as possible.    Next steps for Care Team:  Please print safety plan for pt prior to discharge.    Treatment Objectives Addressed:  rapport building, assessing safety, processing feelings, safety planning    Therapeutic Interventions:  Engaged in safety planning, Explored strategies for self-soothing., Identified and practiced coping skills.    Has a specific means been identified for suicidal.homicide actions: No    Patient coping skills attempted to reduce the crisis:  playing soccer, archery, drawing, sawing, listening to music and watching TV.    Collateral contact information:   Not available at time of assessment    Legal Status: Voluntary/Patient has signed consent for treatment                                                                           Reviewed court records: yes     Psychiatry Consult: Not currently    MICHOACANO Beltre

## 2025-03-04 NOTE — ED PROVIDER NOTES
[facility] EMERGENCY DEPARTMENT   ED Mental Health Observation - Initiation Note    Stewart Moreira was placed into observation at 6:32 AM on 3/4/2025 for Mental health crisis and Psychosis.   Patient is expected to be under observation status for a minimum of eight hours.    MD Henry Avery Jonathan, MD  03/04/25 0681

## 2025-03-04 NOTE — ED PROVIDER NOTES
EMERGENCY DEPARTMENT ENCOUNTER      NAME: Stewart Moreira  AGE: 19 year old female  YOB: 2005  MRN: [id number]  EVALUATION DATE & TIME: 3/4/2025  5:46 AM    PCP: No Ref-Primary, Physician    ED PROVIDER: Javi Figueroa MD      Chief Complaint   Patient presents with    Headache    Suicidal         FINAL IMPRESSION:  1. Suicidal thoughts    2. Auditory hallucinations    3. Acute nonintractable headache, unspecified headache type          ED COURSE & MEDICAL DECISION MAKING:    Pertinent Labs & Imaging studies reviewed. (See chart for details)  19 year old female presents to the Emergency Department for evaluation of headache, suicidal, auditory hallucinations    ED Course as of 03/04/25 0717   Tue Mar 04, 2025   0556 Patient is a 19-year-old female who presents to the emergency department with headache, self-harm thoughts, passive suicidal ideation, headache, auditory hallucinations.  She has a prior history of self-harm by cutting, prior history of mental health hospitalization for suicidality.  For the past 2 days her mood has declined.  No new adjustments in medications.  No alcohol use or drug use in the past day.  She is on sertraline 25 mg daily, and took it today.  She also endorses a headache for the past 2 days, which is intermittent, she has not taken anything for this lately.  No numbness/weakness, neck pain/stiffness, vision changes.  No visual hallucinations.  Her auditory hallucinations have been ongoing for the past 2 weeks, which she has never had before.  She states there are multiple voices, and difficult to understand what they are saying.  Given the new element of auditory hallucinations, suspect possible new diagnosis of schizophrenia, or depression with psychotic features.  Home sertraline ordered.  Migraine cocktail ordered.  Plan for medical clearance with lab work.  DEC consulted, patient placed on one-to-one, currently voluntary and holdable until DEC can assess the  patient.  Low suspicion of meningitis or encephalitis as she does not have fever, vital sign abnormality, neurologic deficits, neck stiffness/rigidity, and her auditory hallucinations can otherwise be explained by her depression.   0653 Alcohol undetectable.  No electrolyte abnormalities or kidney injury.  No leukocytosis or anemia.     Patient signed out to Dr. Vallejo with the following to do:  - f/up DEC recommendations    Medical Decision Making  Obtained supplemental history:Supplemental history obtained?: No  Reviewed external records: External records reviewed?: No  Care impacted by chronic illness:Documented in Chart and Mental Health  Care significantly affected by social determinants of health:N/A  Did you consider but not order tests?: Work up considered but not performed and documented in chart, if applicable  Did you interpret images independently?: Independent interpretation of ECG and images noted in documentation, when applicable.  Consultation discussion with other provider:Did you involve another provider (consultant, , pharmacy, etc.)?: I discussed the care with another health care provider, see documentation for details.  Admission considered. Patient was signed out to the oncoming physician, disposition pending.  Not Applicable      At the conclusion of the encounter I discussed the results of all of the tests and the disposition. The questions were answered. The patient or family acknowledged understanding and was agreeable with the care plan.     0 minutes of critical care time     MEDICATIONS GIVEN IN THE EMERGENCY:  Medications   sodium chloride 0.9% BOLUS 1,000 mL (1,000 mLs Intravenous $New Bag 3/4/25 0626)   sertraline (ZOLOFT) tablet 25 mg (has no administration in time range)   acetaminophen (TYLENOL) tablet 975 mg (975 mg Oral $Given 3/4/25 0631)   ketorolac (TORADOL) injection 15 mg (15 mg Intravenous $Given 3/4/25 0632)   metoclopramide (REGLAN) injection 10 mg (10 mg Intravenous  $Given 3/4/25 0634)   diphenhydrAMINE (BENADRYL) injection 50 mg (50 mg Intravenous $Given 3/4/25 0634)       NEW PRESCRIPTIONS STARTED AT TODAY'S ER VISIT  New Prescriptions    No medications on file          =================================================================    HPI    Patient information was obtained from: patient    Use of : N/A        Stewart Moreira is a 19 year old female with a pertinent history of depression who presents to this ED for evaluation of suicidal thoughts, self-harm thoughts, auditory hallucinations, headache.  For the past 2 days the patient states her mood has declined, despite her consistent use of 25 mg sertraline daily.  Over that time she has felt passive suicidal thoughts, no active plan.  She has also felt a stronger urge to self-harm by cutting, but has not done this currently, but she does have a history of doing this.  She also notes an intermittent headache for the past 2 days, mild, resolved spontaneously.  No medications used for that this morning.  No blurry vision, numbness/weakness.  Recent head trauma includes a concussion on 2/8.  No neck stiffness, fever.  For the past 2 weeks she has noted auditory hallucinations consisting of many voices, which she has a difficult time discerning what is being said.  She feels distracted by this.  She has never had auditory hallucinations with her mental illness before.  She denies visual hallucinations.  No alcohol or drug use in the past day.      PAST MEDICAL HISTORY:  No past medical history on file.    PAST SURGICAL HISTORY:  No past surgical history on file.        CURRENT MEDICATIONS:    ondansetron (ZOFRAN ODT) 4 MG ODT tab        ALLERGIES:  No Known Allergies    FAMILY HISTORY:  No family history on file.    SOCIAL HISTORY:   Social History     Socioeconomic History    Marital status: Single     Social Drivers of Health     Food Insecurity: No Food Insecurity (10/4/2024)    Received from [facility]     "Hunger Vital Sign     Worried About Running Out of Food in the Last Year: Never true     Ran Out of Food in the Last Year: Never true   Transportation Needs: No Transportation Needs (10/4/2024)    [facility]     Lack of Transportation (Medical): No     Lack of Transportation (Non-Medical): No   Housing Stability: Low Risk  (10/4/2024)    Received from [facility]    Housing Stability Vital Sign     Unable to Pay for Housing in the Last Year: No     Number of Times Moved in the Last Year: 0     Homeless in the Last Year: No       VITALS:  BP (!) 131/94   Pulse 95   Temp 98.2  F (36.8  C) (Oral)   Resp 18   Ht 1.626 m (5' 4\")   Wt 77.1 kg (170 lb)   LMP 01/13/2025 (Approximate)   SpO2 98%   BMI 29.18 kg/m      PHYSICAL EXAM    Physical Exam  Vitals and nursing note reviewed.   Constitutional:       General: She is not in acute distress.     Appearance: Normal appearance. She is normal weight. She is not ill-appearing.   HENT:      Head: Normocephalic and atraumatic.      Nose: Nose normal.      Mouth/Throat:      Mouth: Mucous membranes are moist.   Eyes:      Extraocular Movements: Extraocular movements intact.      Conjunctiva/sclera: Conjunctivae normal.   Cardiovascular:      Rate and Rhythm: Normal rate.   Pulmonary:      Effort: Pulmonary effort is normal.   Abdominal:      General: Abdomen is flat.   Musculoskeletal:         General: Normal range of motion.      Cervical back: Normal range of motion.      Right lower leg: No edema.      Left lower leg: No edema.   Skin:     General: Skin is warm and dry.   Neurological:      General: No focal deficit present.      Mental Status: She is alert and oriented to person, place, and time. Mental status is at baseline.   Psychiatric:         Behavior: Behavior normal.         Thought Content: Thought content normal.         Judgment: Judgment normal.      Comments: Flat affect            LAB:  All pertinent labs " reviewed and interpreted.  Results for orders placed or performed during the hospital encounter of 03/04/25   Basic metabolic panel   Result Value Ref Range    Sodium 138 135 - 145 mmol/L    Potassium 3.6 3.4 - 5.3 mmol/L    Chloride 105 98 - 107 mmol/L    Carbon Dioxide (CO2) 24 22 - 29 mmol/L    Anion Gap 9 7 - 15 mmol/L    Urea Nitrogen 12.0 6.0 - 20.0 mg/dL    Creatinine 0.91 0.51 - 0.95 mg/dL    GFR Estimate >90 >60 mL/min/1.73m2    Calcium 9.7 8.8 - 10.4 mg/dL    Glucose 99 70 - 99 mg/dL   Ethyl Alcohol Level   Result Value Ref Range    Alcohol ethyl <0.01 <=0.01 g/dL   COVID-19 Virus (Coronavirus) by PCR Nose    Specimen: Nose; Swab   Result Value Ref Range    SARS CoV2 PCR Negative Negative   CBC with platelets and differential   Result Value Ref Range    WBC Count 7.1 4.0 - 11.0 10e3/uL    RBC Count 4.78 3.80 - 5.20 10e6/uL    Hemoglobin 13.3 11.7 - 15.7 g/dL    Hematocrit 40.4 35.0 - 47.0 %    MCV 85 78 - 100 fL    MCH 27.8 26.5 - 33.0 pg    MCHC 32.9 31.5 - 36.5 g/dL    RDW 12.5 10.0 - 15.0 %    Platelet Count 284 150 - 450 10e3/uL    % Neutrophils 67 %    % Lymphocytes 25 %    % Monocytes 5 %    % Eosinophils 2 %    % Basophils 1 %    % Immature Granulocytes 0 %    NRBCs per 100 WBC 0 <1 /100    Absolute Neutrophils 4.8 1.6 - 8.3 10e3/uL    Absolute Lymphocytes 1.8 0.8 - 5.3 10e3/uL    Absolute Monocytes 0.3 0.0 - 1.3 10e3/uL    Absolute Eosinophils 0.1 0.0 - 0.7 10e3/uL    Absolute Basophils 0.1 0.0 - 0.2 10e3/uL    Absolute Immature Granulocytes 0.0 <=0.4 10e3/uL    Absolute NRBCs 0.0 10e3/uL       RADIOLOGY:  Reviewed all pertinent imaging. Please see official radiology report.  No orders to display       PROCEDURES:   None      [employer] Documentation:   CMS Diagnoses:                Javi Figueroa MD  [facility] EMERGENCY DEPARTMENT  [address]  [place] [address]  [phone]       Javi Figueroa MD  03/04/25 0717

## 2025-03-04 NOTE — ED TRIAGE NOTES
She has been nauseated and headache. She also states hallucination in the form or hearing things. She also endorses thoughts of harming herself starting a few days ago. She does not have a plan at this time. She states using scissors on her arms in the past. The last time was two months but she did not seek help at that time. She has a therapist whom she spoke to yesterday but did not talk about her thoughts of suicide she has had for a few days now.

## 2025-03-04 NOTE — ED PROVIDER NOTES
EMERGENCY DEPARTMENT SIGNOUT NOTE    Patient signed out to me from Dr. Javi Figueroa at 7:17 AM pending DEC assessment.      Stewart Moreira is a 19 year old female who initially presented with headache, self-harm thoughts, passive suicidal ideation, and auditory hallucinations. Declining mood and intermittent headache over the last 2 days, no new or changed medications, patient taking sertraline 25 mg daily.        RADIOLOGY/LABS:  Reviewed all pertinent imaging. Please see official radiology report. All pertinent labs reviewed and interpreted.    Results for orders placed or performed during the hospital encounter of 03/04/25   Basic metabolic panel   Result Value Ref Range    Sodium 138 135 - 145 mmol/L    Potassium 3.6 3.4 - 5.3 mmol/L    Chloride 105 98 - 107 mmol/L    Carbon Dioxide (CO2) 24 22 - 29 mmol/L    Anion Gap 9 7 - 15 mmol/L    Urea Nitrogen 12.0 6.0 - 20.0 mg/dL    Creatinine 0.91 0.51 - 0.95 mg/dL    GFR Estimate >90 >60 mL/min/1.73m2    Calcium 9.7 8.8 - 10.4 mg/dL    Glucose 99 70 - 99 mg/dL   Ethyl Alcohol Level   Result Value Ref Range    Alcohol ethyl <0.01 <=0.01 g/dL   COVID-19 Virus (Coronavirus) by PCR Nose    Specimen: Nose; Swab   Result Value Ref Range    SARS CoV2 PCR Negative Negative   CBC with platelets and differential   Result Value Ref Range    WBC Count 7.1 4.0 - 11.0 10e3/uL    RBC Count 4.78 3.80 - 5.20 10e6/uL    Hemoglobin 13.3 11.7 - 15.7 g/dL    Hematocrit 40.4 35.0 - 47.0 %    MCV 85 78 - 100 fL    MCH 27.8 26.5 - 33.0 pg    MCHC 32.9 31.5 - 36.5 g/dL    RDW 12.5 10.0 - 15.0 %    Platelet Count 284 150 - 450 10e3/uL    % Neutrophils 67 %    % Lymphocytes 25 %    % Monocytes 5 %    % Eosinophils 2 %    % Basophils 1 %    % Immature Granulocytes 0 %    NRBCs per 100 WBC 0 <1 /100    Absolute Neutrophils 4.8 1.6 - 8.3 10e3/uL    Absolute Lymphocytes 1.8 0.8 - 5.3 10e3/uL    Absolute Monocytes 0.3 0.0 - 1.3 10e3/uL    Absolute Eosinophils 0.1 0.0 - 0.7 10e3/uL    Absolute  Basophils 0.1 0.0 - 0.2 10e3/uL    Absolute Immature Granulocytes 0.0 <=0.4 10e3/uL    Absolute NRBCs 0.0 10e3/uL             ED COURSE :  Pertinent Labs & Imaging studies reviewed. (See chart for details)  19 year old female     ED Course as of 03/04/25 0913   e Mar 04, 2025   0556 Patient is a 19-year-old female who presents to the emergency department with headache, self-harm thoughts, passive suicidal ideation, headache, auditory hallucinations.  She has a prior history of self-harm by cutting, prior history of mental health hospitalization for suicidality.  For the past 2 days her mood has declined.  No new adjustments in medications.  No alcohol use or drug use in the past day.  She is on sertraline 25 mg daily, and took it today.  She also endorses a headache for the past 2 days, which is intermittent, she has not taken anything for this lately.  No numbness/weakness, neck pain/stiffness, vision changes.  No visual hallucinations.  Her auditory hallucinations have been ongoing for the past 2 weeks, which she has never had before.  She states there are multiple voices, and difficult to understand what they are saying.  Given the new element of auditory hallucinations, suspect possible new diagnosis of schizophrenia, or depression with psychotic features.  Home sertraline ordered.  Migraine cocktail ordered.  Plan for medical clearance with lab work.  DEC consulted, patient placed on one-to-one, currently voluntary and holdable until DEC can assess the patient.  Low suspicion of meningitis or encephalitis as she does not have fever, vital sign abnormality, neurologic deficits, neck stiffness/rigidity, and her auditory hallucinations can otherwise be explained by her depression.   0653 Alcohol undetectable.  No electrolyte abnormalities or kidney injury.  No leukocytosis or anemia.   0910 Spoke w DEC . Denies auditory hallucinations since in the ED, which are negative self talk. No command  hallucinations. Pt states manageable but more @ night. Seemed to have started after a recent med addition - zoloft, a couple of weeks ago. Would like to increase therapy to 2x/wk, will call therapist to increase this. No SI now.        FINAL IMPRESSION:      The creation of this record is based on the scribe s observations of the work being performed by Ileana Vallejo MD and the provider s statements to them. It was created on his behalf by Jailene Roa, a trained medical scribe. This document has been checked and approved by the attending provider.    Ileana Vallejo MD  Emergency Medicine  [facility] EMERGENCY DEPARTMENT  [facility] [facility] [address] [phone]  Dept: [phone]     Ileana Vallejo MD  03/04/25 0913
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
## 2025-03-05 ENCOUNTER — TELEPHONE (OUTPATIENT)
Dept: BEHAVIORAL HEALTH | Facility: CLINIC | Age: 20
End: 2025-03-05
Payer: COMMERCIAL

## 2025-03-16 ENCOUNTER — HOSPITAL ENCOUNTER (EMERGENCY)
Facility: HOSPITAL | Age: 20
Discharge: PSYCHIATRIC OR SUBSTANCE USE FACILITY | End: 2025-03-17
Attending: EMERGENCY MEDICINE | Admitting: EMERGENCY MEDICINE
Payer: COMMERCIAL

## 2025-03-16 DIAGNOSIS — F32.A DEPRESSION, UNSPECIFIED DEPRESSION TYPE: ICD-10-CM

## 2025-03-16 DIAGNOSIS — R45.851 SUICIDAL IDEATION: ICD-10-CM

## 2025-03-16 PROBLEM — F43.21 ADJUSTMENT DISORDER WITH DEPRESSED MOOD: Status: ACTIVE | Noted: 2025-03-16

## 2025-03-16 LAB
ALBUMIN SERPL BCG-MCNC: 4.3 G/DL (ref 3.5–5.2)
ALP SERPL-CCNC: 89 U/L (ref 40–150)
ALT SERPL W P-5'-P-CCNC: 22 U/L (ref 0–50)
ANION GAP SERPL CALCULATED.3IONS-SCNC: 11 MMOL/L (ref 7–15)
AST SERPL W P-5'-P-CCNC: 24 U/L (ref 0–35)
BASOPHILS # BLD AUTO: 0.1 10E3/UL (ref 0–0.2)
BASOPHILS NFR BLD AUTO: 1 %
BILIRUB SERPL-MCNC: 0.2 MG/DL
BUN SERPL-MCNC: 9.6 MG/DL (ref 6–20)
CALCIUM SERPL-MCNC: 9.5 MG/DL (ref 8.8–10.4)
CHLORIDE SERPL-SCNC: 105 MMOL/L (ref 98–107)
CREAT SERPL-MCNC: 0.84 MG/DL (ref 0.51–0.95)
EGFRCR SERPLBLD CKD-EPI 2021: >90 ML/MIN/1.73M2
EOSINOPHIL # BLD AUTO: 0.1 10E3/UL (ref 0–0.7)
EOSINOPHIL NFR BLD AUTO: 2 %
ERYTHROCYTE [DISTWIDTH] IN BLOOD BY AUTOMATED COUNT: 12.8 % (ref 10–15)
FLUAV RNA SPEC QL NAA+PROBE: NEGATIVE
FLUBV RNA RESP QL NAA+PROBE: NEGATIVE
GLUCOSE SERPL-MCNC: 89 MG/DL (ref 70–99)
HCG SERPL QL: NEGATIVE
HCO3 SERPL-SCNC: 23 MMOL/L (ref 22–29)
HCT VFR BLD AUTO: 39.7 % (ref 35–47)
HGB BLD-MCNC: 13.3 G/DL (ref 11.7–15.7)
IMM GRANULOCYTES # BLD: 0 10E3/UL
IMM GRANULOCYTES NFR BLD: 0 %
LYMPHOCYTES # BLD AUTO: 2.2 10E3/UL (ref 0.8–5.3)
LYMPHOCYTES NFR BLD AUTO: 42 %
MCH RBC QN AUTO: 28.4 PG (ref 26.5–33)
MCHC RBC AUTO-ENTMCNC: 33.5 G/DL (ref 31.5–36.5)
MCV RBC AUTO: 85 FL (ref 78–100)
MONOCYTES # BLD AUTO: 0.3 10E3/UL (ref 0–1.3)
MONOCYTES NFR BLD AUTO: 5 %
NEUTROPHILS # BLD AUTO: 2.7 10E3/UL (ref 1.6–8.3)
NEUTROPHILS NFR BLD AUTO: 50 %
NRBC # BLD AUTO: 0 10E3/UL
NRBC BLD AUTO-RTO: 0 /100
PLATELET # BLD AUTO: 267 10E3/UL (ref 150–450)
POTASSIUM SERPL-SCNC: 3.7 MMOL/L (ref 3.4–5.3)
PROT SERPL-MCNC: 7.7 G/DL (ref 6.4–8.3)
RBC # BLD AUTO: 4.69 10E6/UL (ref 3.8–5.2)
RSV RNA SPEC NAA+PROBE: NEGATIVE
SARS-COV-2 RNA RESP QL NAA+PROBE: NEGATIVE
SODIUM SERPL-SCNC: 139 MMOL/L (ref 135–145)
WBC # BLD AUTO: 5.4 10E3/UL (ref 4–11)

## 2025-03-16 PROCEDURE — 84703 CHORIONIC GONADOTROPIN ASSAY: CPT | Performed by: EMERGENCY MEDICINE

## 2025-03-16 PROCEDURE — 36415 COLL VENOUS BLD VENIPUNCTURE: CPT | Performed by: EMERGENCY MEDICINE

## 2025-03-16 PROCEDURE — 85025 COMPLETE CBC W/AUTO DIFF WBC: CPT | Performed by: EMERGENCY MEDICINE

## 2025-03-16 PROCEDURE — 84075 ASSAY ALKALINE PHOSPHATASE: CPT | Performed by: EMERGENCY MEDICINE

## 2025-03-16 PROCEDURE — 87637 SARSCOV2&INF A&B&RSV AMP PRB: CPT | Performed by: EMERGENCY MEDICINE

## 2025-03-16 PROCEDURE — 84155 ASSAY OF PROTEIN SERUM: CPT | Performed by: EMERGENCY MEDICINE

## 2025-03-16 PROCEDURE — 99285 EMERGENCY DEPT VISIT HI MDM: CPT

## 2025-03-16 PROCEDURE — 82310 ASSAY OF CALCIUM: CPT | Performed by: EMERGENCY MEDICINE

## 2025-03-16 RX ORDER — LEVONORGESTREL/ETHIN.ESTRADIOL 0.1-0.02MG
1 TABLET ORAL DAILY
COMMUNITY

## 2025-03-16 RX ORDER — VITAMIN B COMPLEX
25 TABLET ORAL DAILY
COMMUNITY

## 2025-03-16 ASSESSMENT — COLUMBIA-SUICIDE SEVERITY RATING SCALE - C-SSRS
4. HAVE YOU HAD THESE THOUGHTS AND HAD SOME INTENTION OF ACTING ON THEM?: NO
3. HAVE YOU BEEN THINKING ABOUT HOW YOU MIGHT KILL YOURSELF?: YES
5. HAVE YOU STARTED TO WORK OUT OR WORKED OUT THE DETAILS OF HOW TO KILL YOURSELF? DO YOU INTEND TO CARRY OUT THIS PLAN?: YES
1. IN THE PAST MONTH, HAVE YOU WISHED YOU WERE DEAD OR WISHED YOU COULD GO TO SLEEP AND NOT WAKE UP?: YES
6. HAVE YOU EVER DONE ANYTHING, STARTED TO DO ANYTHING, OR PREPARED TO DO ANYTHING TO END YOUR LIFE?: YES
2. HAVE YOU ACTUALLY HAD ANY THOUGHTS OF KILLING YOURSELF IN THE PAST MONTH?: YES

## 2025-03-16 ASSESSMENT — ACTIVITIES OF DAILY LIVING (ADL)
ADLS_ACUITY_SCORE: 41

## 2025-03-16 NOTE — ED PROVIDER NOTES
"EMERGENCY DEPARTMENT NOTE     Name: Stewart Moreira    Age/Sex: 19 year old female   MRN: [id number]   Evaluation Date & Time:  3/16/2025  6:13 PM    PCP:    No Ref-Primary, Physician   ED Provider: Avi Saavedra D.O.       CHIEF COMPLAINT    Suicidal     HISTORY OF PRESENT ILLNESS   Stewart Moreira is a 19 year old year old female with a relevant past history of depression and anxiety, who presents to the ED  for evaluation of suicidal.    Patient reports prior to arriving to the ED that she was going to \"jump off a bridge\" after some recent stressors in her life from a relationship break-up. She states she was standing on the edge, but did not actually jump. She states she had a suicide attempt in July 2024 where she was seen here and then transferred to [facility], which she states helped her. Patient has a history of self-injurious behavior via cutting. Patient denies any recent self-harm.     Patient recently saw her PCP a couple weeks ago for auditory hallucinations. They stopped her Zoloft prescription at that time. Patient denies any recent auditory or visual hallucination recently. No drug or alcohol use.     No other symptoms, complaints, or concerns at this time.    DIAGNOSIS & DISPOSITION/MEDICAL DECISION MAKING     1. Depression, unspecified depression type    2. Suicidal ideation        EMERGENCY DEPARTMENT COURSE   6:10 PM I met with the patient to gather history and to perform my initial exam.  We discussed treatment options and the plan for care while in the Emergency Department.  Triage vital signs: /78   Pulse 61   Temp 98.2  F (36.8  C) (Oral)   Resp 18   Ht 1.626 m (5' 4\")   Wt 77.1 kg (170 lb)   LMP 01/13/2025 (Approximate)   SpO2 99%   BMI 29.18 kg/m    Differential diagnosis considered included but not limited to:depression,suicidality    MDM: Patient has history of depression.  She was recently on SSRI which was stopped abruptly with the patient experiencing auditory " "hallucinations which had resolved.  Patient has had increasing depression and has had social stressors with loss of relationship and possibly on loss of job pending.  Tonight she was contemplating jumping from a bridge but elected to come to the emergency department.  Patient endorses depression and ongoing suicidal thoughts.  Patient was interviewed by DEC  and recommendation is for inpatient psychiatric admission for stabilization of mood.  Patient is currently in agreement with this.  Patient will be boarded in the emergency department pending inpatient psychiatric admission currently voluntary but holdable.  Patient will be signed out at change of shift to Dr Wood       Discharge Vital Signs:/78   Pulse 61   Temp 98.2  F (36.8  C) (Oral)   Resp 18   Ht 1.626 m (5' 4\")   Wt 77.1 kg (170 lb)   LMP 01/13/2025 (Approximate)   SpO2 99%   BMI 29.18 kg/m     PROCEDURES:   None  Diagnostic studies:  No orders to display     Labs Ordered and Resulted from Time of ED Arrival to Time of ED Departure   COMPREHENSIVE METABOLIC PANEL - Normal       Result Value    Sodium 139      Potassium 3.7      Carbon Dioxide (CO2) 23      Anion Gap 11      Urea Nitrogen 9.6      Creatinine 0.84      GFR Estimate >90      Calcium 9.5      Chloride 105      Glucose 89      Alkaline Phosphatase 89      AST 24      ALT 22      Protein Total 7.7      Albumin 4.3      Bilirubin Total 0.2     HCG QUALITATIVE PREGNANCY - Normal    hCG Serum Qualitative Negative     INFLUENZA A/B, RSV AND SARS-COV2 PCR - Normal    Influenza A PCR Negative      Influenza B PCR Negative      RSV PCR Negative      SARS CoV2 PCR Negative     CBC WITH PLATELETS AND DIFFERENTIAL    WBC Count 5.4      RBC Count 4.69      Hemoglobin 13.3      Hematocrit 39.7      MCV 85      MCH 28.4      MCHC 33.5      RDW 12.8      Platelet Count 267      % Neutrophils 50      % Lymphocytes 42      % Monocytes 5      % Eosinophils 2      % Basophils 1      % " Immature Granulocytes 0      NRBCs per 100 WBC 0      Absolute Neutrophils 2.7      Absolute Lymphocytes 2.2      Absolute Monocytes 0.3      Absolute Eosinophils 0.1      Absolute Basophils 0.1      Absolute Immature Granulocytes 0.0      Absolute NRBCs 0.0       ED INTERVENTIONS   Medications - No data to display  TOTAL CRITICAL CARE TIME (EXCLUDING PROCEDURES): Not applicable      DISCHARGE MEDICATIONS        Review of your medicines        UNREVIEWED medicines. Ask your doctor about these medicines        Dose / Directions   levonorgestrel-ethinyl estradiol 0.1-20 MG-MCG tablet  Commonly known as: AVIANE      Dose: 1 tablet  Take 1 tablet by mouth daily.  Refills: 0     Vitamin D3 25 mcg (1000 units) tablet  Commonly known as: CHOLECALCIFEROL      Dose: 25 mcg  Take 25 mcg by mouth daily.  Refills: 0            DISPOSITION: Awaitng Inpatient Mental Health Placement/Transfer    Medical Decision Making    At the time of my evaluation, I do not feel the patient s symptoms are caused by sepsis      I obtained additional history from these independent historians: N/A    I reviewed these outside records:  3/4/2025 Patient was seen at [facility] ED for evaluation of suicidal ideation, headache, and auditory hallucinations. DEC consulted. Patient wishes to increase therapy x2 a week. Hallucinations and suicidal ideation resolved in the ED. Patient discharged home in stable condition.    I noted these abnormal vital signs / labs:  RUFINO    Monitor Strip Interpretation:  RUFINO  12-Lead ECG Interpretation:  RUFINO  I independently reviewed the following diagnostic studies:  RUFINO  I spoke to the following clinicians regard the patients care: ELSA Vargas (Naheed Hawkins)    My disposition decision is based on the following reasons:  Pending Transfer for inpatient mental Health Admission      MIPS Documentation Not Applicable    None     At the conclusion of the encounter I discussed the results of all of the  "tests and the disposition. The questions were answered. The patient or family acknowledged understanding and was agreeable with the care plan.            INFORMATION SOURCE AND LIMITATIONS    History/Exam limitations: None.  Patient information was obtained from: None.  Use of : N/A        REVIEW OF SYSTEMS:   All other systems reviewed and are negative except as noted above in HPI.    PATIENT HISTORY   No past medical history on file.  Patient Active Problem List   Diagnosis    Major depressive disorder, single episode, severe with psychotic features (H)    DARIA (generalized anxiety disorder)    Adjustment disorder with depressed mood     No past surgical history on file.    No Known Allergies    OUTPATIENT MEDICATIONS     New Prescriptions    No medications on file      Vitals:    03/16/25 1804 03/16/25 1806 03/16/25 1808   BP:  113/78    Pulse: 61     Resp: 18     Temp:  98.2  F (36.8  C)    TempSrc:  Oral    SpO2: 99%     Weight:   77.1 kg (170 lb)   Height:   1.626 m (5' 4\")       Physical Exam   Constitutional: Oriented to person, place, and time. Appears well-developed and well-nourished.   Cardiovascular: Normal rate, regular rhythm and normal heart sounds.    Pulmonary/Chest: Normal effort  and breath sounds normal.   Abdominal: Soft. Bowel sounds are normal.   Musculoskeletal: Normal range of motion.   Neurological: Alert and oriented to person, place, and time. Normal strength. No sensory deficit. No cranial nerve deficit.  Skin: Skin is warm and dry.   Psychiatric: Depressed mood and affect.  No current auditory or visual hallucinations.  Endorses suicidal ideation and earlier was contemplating jumping from a bridge.  Behavior is normal. Thought content normal.     DIAGNOSTICS    LABORATORY FINDINGS (REVIEWED AND INTERPRETED):  Labs Ordered and Resulted from Time of ED Arrival to Time of ED Departure   COMPREHENSIVE METABOLIC PANEL - Normal       Result Value    Sodium 139      Potassium 3.7   "    Carbon Dioxide (CO2) 23      Anion Gap 11      Urea Nitrogen 9.6      Creatinine 0.84      GFR Estimate >90      Calcium 9.5      Chloride 105      Glucose 89      Alkaline Phosphatase 89      AST 24      ALT 22      Protein Total 7.7      Albumin 4.3      Bilirubin Total 0.2     HCG QUALITATIVE PREGNANCY - Normal    hCG Serum Qualitative Negative     INFLUENZA A/B, RSV AND SARS-COV2 PCR - Normal    Influenza A PCR Negative      Influenza B PCR Negative      RSV PCR Negative      SARS CoV2 PCR Negative     CBC WITH PLATELETS AND DIFFERENTIAL    WBC Count 5.4      RBC Count 4.69      Hemoglobin 13.3      Hematocrit 39.7      MCV 85      MCH 28.4      MCHC 33.5      RDW 12.8      Platelet Count 267      % Neutrophils 50      % Lymphocytes 42      % Monocytes 5      % Eosinophils 2      % Basophils 1      % Immature Granulocytes 0      NRBCs per 100 WBC 0      Absolute Neutrophils 2.7      Absolute Lymphocytes 2.2      Absolute Monocytes 0.3      Absolute Eosinophils 0.1      Absolute Basophils 0.1      Absolute Immature Granulocytes 0.0      Absolute NRBCs 0.0           IMAGING (REVIEWED AND INTERPRETED):  No orders to display             I, Samantha Vivas, am serving as a scribe to document services personally performed by Avi Saavedra DO, based on my observations and the provider's statements to me.  I, Avi Saavedra DO, attest that Samantha Vivas is acting in a scribe capacity, has observed my performance of the services and has documented them in accordance with my direction.      Avi Saavedra D.O.  EMERGENCY MEDICINE   03/16/25  [facility] EMERGENCY DEPARTMENT  [address] [place] [id number]  [phone]  Dept: [phone]     Avi Saavedra DO  03/16/25 3500

## 2025-03-16 NOTE — ED TRIAGE NOTES
"Pt arrives alone to triage ambulatory by private vehicle endorses \"I was going to jump off a bridge\" just prior to coming in. Denies self harm, ingestion, drugs or alcohol. Pt endorses last time cut self harm 3-4 months ago and last suicide attempt was July 2024-\"I dont remember\" details. Denies visual or auditory hallucinations currently- last auditory hallucinations being about 2 weeks ago. Partner broke up with her under a week ago she endorses causes some additional depression.      Stopped taking Zoloft 1-2 weeks ago. Pt endorses she was advised by a DrJay To get off the medication \"I was hearing voices\".  Endorses she completely stopped at once.  "

## 2025-03-17 ENCOUNTER — TELEPHONE (OUTPATIENT)
Dept: BEHAVIORAL HEALTH | Facility: CLINIC | Age: 20
End: 2025-03-17
Payer: COMMERCIAL

## 2025-03-17 VITALS
WEIGHT: 170 LBS | DIASTOLIC BLOOD PRESSURE: 58 MMHG | BODY MASS INDEX: 29.02 KG/M2 | HEIGHT: 64 IN | TEMPERATURE: 97.9 F | HEART RATE: 46 BPM | OXYGEN SATURATION: 99 % | SYSTOLIC BLOOD PRESSURE: 102 MMHG | RESPIRATION RATE: 18 BRPM

## 2025-03-17 LAB
AMPHETAMINES UR QL SCN: NORMAL
BARBITURATES UR QL SCN: NORMAL
BENZODIAZ UR QL SCN: NORMAL
BZE UR QL SCN: NORMAL
CANNABINOIDS UR QL SCN: NORMAL
FENTANYL UR QL: NORMAL
OPIATES UR QL SCN: NORMAL
PCP QUAL URINE (ROCHE): NORMAL

## 2025-03-17 PROCEDURE — 80307 DRUG TEST PRSMV CHEM ANLYZR: CPT | Performed by: EMERGENCY MEDICINE

## 2025-03-17 ASSESSMENT — ACTIVITIES OF DAILY LIVING (ADL)
ADLS_ACUITY_SCORE: 41

## 2025-03-17 NOTE — CONSULTS
Initial Psychiatric Consult   Consult date: March 17, 2025    Patient transferred to [facility] for  mental health.        Page me or re-consult psychiatry as needed (psychiatry is signing off).       Allyssa Rhodes, VILMA, APRN  Consult/Liaison Psychiatry  [facility]   Contact information available via [facility] Paging/Directory.  If I am not available, please call [facility] intake ([phone])

## 2025-03-17 NOTE — ED NOTES
IP MH Referral Acuity Rating Score (RARS)    LMHP complete at referral to IP MH, with DEC; and, daily while awaiting IP MH placement. Call score to PPS.  CRITERIA SCORING   New 72 HH and Involuntary for IP MH (not adolescent) 0/3   Boarding over 24 hours 0/1   Vulnerable adult at least 55+ with multiple co morbidities; or, Patient age 11 or under 0/1   Suicide ideation without relief of precipitating factors 1/1   Current plan for suicide 0/1   Current plan for homicide 0/1   Imminent risk or actual attempt to seriously harm another without relief of factors precipitating the attempt 0/1   Severe dysfunction in daily living (ex: complete neglect for self care, extreme disruption in vegetative function, extreme deterioration in social interactions) 1/1   Recent (last 2 weeks) or current physical aggression in the ED 0/1   Restraints or seclusion episode in ED 0/1   Verbal aggression, agitation, yelling, etc., while in the ED 0/1   Active psychosis with psychomotor agitation or catatonia 0/1   Need for constant or near constant redirection (from leaving, from others, etc).  0/1   Intrusive or disruptive behaviors 0/1   TOTAL 2

## 2025-03-17 NOTE — PHARMACY-ADMISSION MEDICATION HISTORY
Pharmacist Admission Medication History    Admission medication history is complete. The information provided in this note is only as accurate as the sources available at the time of the update.    Information Source(s): Patient and CareEverywhere/SureScripts via in-person    Pertinent Information: none    Changes made to PTA medication list:  Added: OCP, vitamin D  Deleted: Zofran PRN (rx from January 2025, pt reports therapy completed)  Changed: None    Allergies reviewed with patient and updates made in EHR: yes    Medication History Completed By: Nicollette McMann, RPH 3/16/2025 10:27 PM    PTA Med List   Medication Sig Last Dose/Taking    levonorgestrel-ethinyl estradiol (AVIANE) 0.1-20 MG-MCG tablet Take 1 tablet by mouth daily. 3/13/2025    Vitamin D3 (CHOLECALCIFEROL) 25 mcg (1000 units) tablet Take 25 mcg by mouth daily. 3/13/2025

## 2025-03-17 NOTE — TELEPHONE ENCOUNTER
1:35am - [facility] willing to review.     2:00am - Sent all requested info to PC to begin reviewing for possible IPMH placement. Awaiting update.     4:03am - PC called asking if UDS has been collected. Intake will call [facility] ED for update.     4:05am - ED RN stated pt has not voided yet but will collect as soon as she does.     4:57am - PC called, and stated they tentatively accept pt for IPMH but need the UTOX to give accepting orders and N2N number for report. Writer will pass on to [facility] ED.     5:33am - Notified [facility] ED. They will try and collect the UTOX. Awaiting update.         R: [place]  Access Inpatient Adult Bed Call Log  3/17/25 @ 1:00am   Intake has called facilities that have not updated their bed status within the last 12 hours.     *METRO:  [place] [facility]: @ capacity.  [facility]: POSTING 2 BEDS. No reviews overnight. #[phone]  [place] -- [facility]: @ cap per website. Low acuity. #[phone]  [place] -- [facility]: @ cap per website. Low acuity only. #[phone]  [place] [facility]: @ cap per website. #[phone]  [facility]: @ cap per website. #[phone]  [facility]/ beds: POSTING 6 BEDS. Ages 18-35, Voluntary only, NO aggression/physical or sexual assault/violence hx, or drug abuse. Negative Covid. #[phone]  [place] [facility]: @ cap per website. #[phone]  [place] -- [facility]: @ cap per website. #[phone]  [place] -- [facility]: POSTING 1 BED. No reviews overnight. #[phone]     Pt remains on waitlist pending appropriate placement availability.

## 2025-03-17 NOTE — CONSULTS
"Diagnostic Evaluation Consultation  Crisis Assessment    Patient Name: Stewart Moreira  Age:  19 year old  Legal Sex: female  Gender Identity: female  Pronouns:   Race: White  Ethnicity: Not  or   Language: English      Patient was assessed: Virtual: [employer]   Crisis Assessment Start Date: 03/16/25  Crisis Assessment Start Time: 2025  Crisis Assessment Stop Time: 2038  Patient location: [facility] Emergency Department                             [id number]    Referral Data and Chief Complaint  Stewart Moreria presents to the ED by  self. Patient is presenting to the ED for the following concerns: Suicidal ideation, Depression, Suicide attempt. Factors that make the mental health crisis life threatening or complex are: Pt presents to the ED for suicide ideation with aborted suicide attempt to jump off a bridge; stood on the bridge, but did not jump. Pt reports she doesn't know how she feels about her aborted attempt. Pt reports, \"a lot has been going on, my partner dymped me a couple days ago. I don't know what happened. I have tried to talk to them, but haven't gotten a response. I haven't reached out to anyone. It's hard to talk to my parents sometimes. I don't have anyone else.\"  Pt report she is unsure what lead to her attempt today, \"I don't know. I am not sure if it was impulsive or not.\" Pt reports she does not know how to feel about being alive. Pt reports ongoing suicide ideation, but denies having a plan, means, or intent to harm herself or others..      Informed Consent and Assessment Methods  Explained the crisis assessment process, including applicable information disclosures and limits to confidentiality, assessed understanding of the process, and obtained consent to proceed with the assessment.  Assessment methods included conducting a formal interview with patient, review of medical records, collaboration with medical staff, and obtaining relevant collateral " "information from family and community providers when available.  : done     History of the Crisis   Pt reports history of autism, ADHD, anxiety, depression, and PTSD. Pt reports history of ED visits (last on 3/4/2025 for similar presentation) and reports history of inpatient psychiatric hospitalization (for 1 week at [facility] in July 2024). Pt reports history of suicide ideation and suicide attempt. Pt reports her suicide ideation has been ongoing since her last ED visit, and has increased in frequency and intensity. Pt reports history of 1 previous suicide attempt, but does not recall how, \"It was in July 2024. I went to inpatient after.\" Pt reports history of self-harm, via cutting, last engaged in 3-4 months ago. Pt reports history of auditory hallucinations, which were a side effect of Zoloft, which she stopped; as directed by her psychiatrist. Pt denies history of hallucinations or delusions, outside of mediation side effects. Pt reports she currently has a therapist they seek weekly, next appt Monday, and attends [facility] for medication management services, last seen 3 weeks ago. Pt reports she spoke with her provider 1 week ago, after her ED visit.    Brief Psychosocial History  Family:  Single, Children no  Support System:  Parent(s)  Employment Status:  employed full-time  Source of Income:  salary/wages  Financial Environmental Concerns:  none  Current Hobbies:  arts/crafts, games, group/social activities, music, cooking/baking, social media/computer activities, exercise/fitness, television/movies/videos, interaction with pets  Barriers in Personal Life:  emotional concerns, mental health concerns    Significant Clinical History  Current Anxiety Symptoms:     Current Depression/Trauma:  thoughts of death/suicide, negativistic, crying or feels like crying, low self esteem, irritable, helplessness, hopelessness, excessive guilt, apathy, impaired decision making  Current Somatic Symptoms:   " "  Current Psychosis/Thought Disturbance:  impulsive, high risk behavior  Current Eating Symptoms:     Chemical Use History:  Alcohol: None  Benzodiazepines: None  Opiates: None  Cocaine: None  Marijuana: None  Other Use: None   Past diagnosis:  ADHD, Anxiety Disorder, Depression, PTSD, Autism, Suicide attempt(s)  Family history:  Depression  Past treatment:  Individual therapy, Psychiatric Medication Management, Inpatient Hospitalization  Details of most recent treatment:  Pt reports she currently has a therapist they seek weekly, next appt Monday, and attends [facility] for medication management services, last seen 3 weeks ago. Pt reports she spoke with her provider 1 week ago, after her ED visit.  Other relevant history:  Pt reports she lives with family. Pt denies history of legal issues or  involvement. Pt denies history of chronic medical issues. Pt reports history of concussion.    Have there been any medication changes in the past two weeks:  yes, please comment  Pt spoke to provider 1 week ago and provider recommended stopping Zoloft due to hallucinations.    Is the patient compliant with medications:  yes        Collateral Information  Is there collateral information: Yes     Collateral information name, relationship, phone number:  ISSA SANCHES (Father) -- -- [phone]    What happened today: Reports pt was at work and went right to ED after work. Reports he has texted pt about plans for dinner, but did not get a response.     What is different about patient's functioning: Reports pt appears self-aware and responsible and she only seeks help when she needs it. \"We have been trying to support her, doing karakoe, and spending time with her.\" Reports boyfriend just broke up with pt and pt mother reports \"she is on discord and her boyfriend used to be on it. Someone messaged my wife saying that Stewart wasn't safe and we need to be better parents. They kicked us out of the discord group, but we " "were able to track her through her phone and car. We saw she was at [facility] and have been waiting to hear from the hospital, since she doesn't have her phone.\" Reports the last time this happened, pt went to [facility]. Reports pt works at [employer], which is closing.     What do you think the patient needs:      Has patient made comments about wanting to kill themselves/others: no    If d/c is recommended, can they take part in safety/aftercare planning:  yes (Reports they have a good relationship with pt.)    Additional collateral information:        Risk Assessment  [place] Suicide Severity Rating Scale Full Clinical Version:  Suicidal Ideation  Q1 Wish to be Dead (Lifetime): Yes  Q2 Non-Specific Active Suicidal Thoughts (Lifetime): Yes  3. Active Suicidal Ideation with any Methods (Not Plan) Without Intent to Act (Lifetime): Yes  4. Active Suicidal Ideation with Some Intent to Act, Without Specific Plan (Lifetime): Yes  5. Active Suicidal Ideation with Specific Plan and Intent (Lifetime): Yes  Q6 Suicide Behavior (Lifetime): yes  Intensity of Ideation (Lifetime)  Most Severe Ideation Rating (Lifetime): 5  Frequency (Lifetime): Many times each day  Duration (Lifetime): 4-8 hours/most of day  Controllability (Lifetime): Can control thoughts with a lot of difficulty  Deterrents (Lifetime): Deterrents most likely did not stop you  Reasons for Ideation (Lifetime): Mostly to end or stop the pain (You couldn't go on living with the pain or how you were feeling)  Suicidal Behavior (Lifetime)  Actual Attempt (Lifetime): Yes  Total Number of Actual Attempts (Lifetime): 1  Actual Attempt Description (Lifetime): Per chart review, in high school via overdose  Has subject engaged in non-suicidal self-injurious behavior? (Lifetime): Yes  Interrupted Attempts (Lifetime): No  Aborted or Self-Interrupted Attempt (Lifetime): Yes  Total Number of Aborted or Self-Interrupted Attempts (Lifetime): 2  Aborted or " Self-Interrupted Attempt Description (Lifetime): July 2024 and March 2025 via jumping off a bridge, but stopped herself.  Preparatory Acts or Behavior (Lifetime): No    [place] Suicide Severity Rating Scale Recent:   Suicidal Ideation (Recent)  Q1 Wished to be Dead (Past Month): yes  Q2 Suicidal Thoughts (Past Month): yes  Q3 Suicidal Thought Method: yes  Q4 Suicidal Intent without Specific Plan: yes  Q5 Suicide Intent with Specific Plan: yes  If yes to Q6, within past 3 months?: yes  Level of Risk per Screen: high risk  Intensity of Ideation (Recent)  Most Severe Ideation Rating (Past 1 Month): 5  Frequency (Past 1 Month): Many times each day  Duration (Past 1 Month): 4-8 hours/most of day  Controllability (Past 1 Month): Can control thoughts with a lot of difficulty  Deterrents (Past 1 Month): Deterrents most likely did not stop you  Reasons for Ideation (Past 1 Month): Mostly to end or stop the pain (You couldn't go on living with the pain or how you were feeling)  Suicidal Behavior (Recent)  Actual Attempt (Past 3 Months): No  Has subject engaged in non-suicidal self-injurious behavior? (Past 3 Months): Yes  Interrupted Attempts (Past 3 Months): No  Aborted or Self-Interrupted Attempt (Past 3 Months): Yes  Total Number of Aborted or Self-Interrupted Attempts (Past 3 Months): 1  Aborted or Self-Interrupted Attempt Description (Past 3 Months): March 2025 via jumping off a bridge, but stopped herself.  Preparatory Acts or Behavior (Past 3 Months): No    Environmental or Psychosocial Events: challenging interpersonal relationships, impulsivity/recklessness, helplessness/hopelessness  Protective Factors: Protective Factors: strong bond to family unit, community support, or employment, lives in a responsibly safe and stable environment, able to access care without barriers, help seeking, sense of importance of health and wellness, sense of belonging, constructive use of leisure time, enjoyable activities, resilience,  optimistic outlook - identification of future goals, other (see comment), supportive ongoing medical and mental health care relationships    Does the patient have thoughts of harming others? Feels Like Hurting Others: no  Previous Attempt to Hurt Others: no  Is the patient engaging in sexually inappropriate behavior?: no  Does Patient have a known history of aggressive behavior: No  Has aggression occurred as a result of MH concerns/diagnosis: pt denies  Does patient have history of aggression in hospital: pt denies    Is the patient engaging in sexually inappropriate behavior?  no        Mental Status Exam   Affect: Appropriate  Appearance: Appropriate  Attention Span/Concentration: Attentive  Eye Contact: Variable    Fund of Knowledge: Appropriate   Language /Speech Content: Fluent  Language /Speech Volume: Normal  Language /Speech Rate/Productions: Minimally Responsive  Recent Memory: Intact  Remote Memory: Intact  Mood: Apathetic, Depressed  Orientation to Person: Yes   Orientation to Place: Yes  Orientation to Time of Day: Yes  Orientation to Date: Yes     Situation (Do they understand why they are here?): Yes  Psychomotor Behavior: Normal  Thought Content: Suicidal  Thought Form: Intact     Mini-Cog Assessment  Number of Words Recalled:    Clock-Drawing Test:     Three Item Recall:    Mini-Cog Total Score:       Medication  Psychotropic medications:   Medication Orders - Psychiatric (From admission, onward)      None             Current Care Team  Patient Care Team:  No Ref-Primary, Physician as PCP - General    Diagnosis  Patient Active Problem List   Diagnosis Code    Major depressive disorder, single episode, severe with psychotic features (H) F32.3    DARIA (generalized anxiety disorder) F41.1    Adjustment disorder with depressed mood F43.21       Primary Problem This Admission  Active Hospital Problems    *Adjustment disorder with depressed mood        Clinical Summary and Substantiation of Recommendations    Clinical Substantiation:  It is the recommendation of this clinician that pt admit to [facility] for safety and stabilization. Pt displays the following risk factors that support IP admission: Pt presents to ED for aborted suicide attempt via jumping off of a bridge, but stopped herself. Pt maintains suicide ideation, but denies current plans, means, or intent to harm herself or others. Pt denies current hallucinations or delusions and does not appear to be responding to internal stimuli. Pt appears flat, apathetic, and unsure about being alive. Pt was minimally responsive, but help-seeking and open to recommendations. Pt reports she stopped Zoloft 1 week ago, at the recommendation of her medication provider, after having sudden onset of auditory hallucinations. Pt reports hallucinations stopped after she stopped medication. Other stressors are a recent break up and the company she works at is closing. Pt is unable to engage in safety planning to mitigate risk level in a non-secure setting. Lower levels of care are not sufficient. Due to this IP is the least restrictive option of care for pt. Pt should remain in IP until deemed safe to return to the community and engage in OP  supports. Pt would benefit from coordination with her current therapist and medication provider. If pt is wanting new providers or wants to explore programmatic, referrals and appointments may be provided/scheduled.    Goals for crisis stabilization:  stabilize mental health, review medication    Next steps for Care Team:  pt added to IP work list. Family would like to be involved in aftercare and safety planning.    Treatment Objectives Addressed:  rapport building, assessing safety, processing feelings, safety planning, orienting the patient to therapy, identifying additional supports, exploring obstacles to safety in the community, identifying an appropriate aftercare plan    Therapeutic Interventions:  Engaged in safety planning, Engaged in  guided discovery, explored patient's perspectives and helped expand them through socratic dialogue., Engaged in cognitive restructuring/ reframing, looked at common cognitive distortions and challenged negative thoughts., Discussed and practiced mindfulness.    Has a specific means been identified for suicidal/homicide actions: Yes    If yes, describe:  pt had aborted attempt via standing on a bridge, but did not jump off    Explain action steps toward mitigation:  Unable to restrict access to bridges completely, due to their presence in communities/roads. Would recommend increased supervision by support network.    Document completion of mitigation actions:  None at this time.    The follow up action still needed prior to discharge:  Would recommend involving family with safety/discharge planning. Family would like to be involved.    Patient coping skills attempted to reduce the crisis:  help-seeking    Disposition  Recommended referrals: Individual Therapy, Medication Management        Reviewed case and recommendations with attending provider. Attending Name: Dr. Saavedra       Attending concurs with disposition: yes       Patient and/or validated legal guardian concurs with disposition:   yes       Final disposition:  inpatient mental health         Imminent risk of harm: Suicidal Behavior  Severe psychiatric, behavioral or other comorbid conditions are appropriate for management at inpatient mental health as indicated by at least one of the following: Psychiatric Symptoms, Impaired impulse control, judgement, or insight  Severe dysfunction in daily living is present as indicated by at least one of the following: Extreme deterioration in social interactions, Complete inability to maintain any appropriate aspect of personal responsibility in any adult roles, Other evidence of severe dysfunction  Situation and expectations are appropriate for inpatient care: Patient management/treatment at lower level of care is  not feasible or is inappropriate, Biopsychosocial stresses potentially contributing to clinical presentation (co morbidities) have been assessed and are absent or manageable at proposed level of care  Inpatient mental health services are necessary to meet patient needs and at least one of the following: Specific condition related to admission diagnosis is present and judged likely to further improve at proposed level of care, Specific condition related to admission diagnosis is present and judged likely to deteriorate in absence of treatment at proposed level of care      Legal status: Voluntary/Patient has signed consent for treatment                                                                                                                                 Reviewed court records: yes       Assessment Details   Total duration spent with the patient: 13 min     CPT code(s) utilized: Non-Billable    GEORGES Urbano, Psychotherapist  [facility] - Triage & Transition Services  Callback: [phone]

## 2025-03-17 NOTE — ED NOTES
Patient has been appropriate and cooperative this shift.  She was able to sleep comfortably.  No behavioral concerns.  [facility] will likely accept patient when urine drug screen results.

## 2025-03-17 NOTE — ED PROVIDER NOTES
[facility] EMERGENCY DEPARTMENT   ED Mental Health Observation - Discharge Note (Brief)    Stewart Moreira is boarding in the ED after undergoing evaluation for Mental health crisis  Please see the daily progress note for this patient's presentation, physical examination, and work up.    Upon reevaluation and discussion with DEC , we believe patient has shown insufficient improvement and thus will be Transferred.    EMERGENCY DEPARTMENT OBSERVATION status ended at 11:53 AM 3/17/2025    Discharge Management Time: < 30 minutes    1. Depression, unspecified depression type    2. Suicidal ideation      Patient remains voluntary but holdable.  Accepted for transfer to [facility] mental health.      MD Patience Green Daniel, MD  03/17/25 [id number]

## 2025-03-17 NOTE — TELEPHONE ENCOUNTER
10:28 AM Intake called PC spoke with [facility], awaiting on discharge. WCB     10:45 AM Accepting provider - Dr. Bates [phone]     10:50 AM Intake called ED with bed placement information

## 2025-03-17 NOTE — PLAN OF CARE
Stewart Moreira  March 16, 2025  Plan of Care Hand-off Note     Patient Recommended Care Path: inpatient mental health    Clinical Substantiation:  It is the recommendation of this clinician that pt admit to  MH for safety and stabilization. Pt displays the following risk factors that support IP admission: Pt presents to ED for aborted suicide attempt via jumping off of a bridge, but stopped herself. Pt maintains suicide ideation, but denies current plans, means, or intent to harm herself or others. Pt denies current hallucinations or delusions and does not appear to be responding to internal stimuli. Pt appears flat, apathetic, and unsure about being alive. Pt was minimally responsive, but help-seeking and open to recommendations. Pt reports she stopped Zoloft 1 week ago, at the recommendation of her medication provider, after having sudden onset of auditory hallucinations. Pt reports hallucinations stopped after she stopped medication. Other stressors are a recent break up and the company she works at is closing. Pt is unable to engage in safety planning to mitigate risk level in a non-secure setting. Lower levels of care are not sufficient. Due to this IP is the least restrictive option of care for pt. Pt should remain in IP until deemed safe to return to the community and engage in OP  supports. Pt would benefit from coordination with her current therapist and medication provider. If pt is wanting new providers or wants to explore programmatic, referrals and appointments may be provided/scheduled.    Goals for crisis stabilization:  stabilize mental health, review medication    Next steps for Care Team:  pt added to IP work list. Family would like to be involved in aftercare and safety planning.    Treatment Objectives Addressed:  rapport building, assessing safety, processing feelings, safety planning, orienting the patient to therapy, identifying additional supports, exploring obstacles to safety in the  community, identifying an appropriate aftercare plan    Therapeutic Interventions:  Engaged in safety planning, Engaged in guided discovery, explored patient's perspectives and helped expand them through socratic dialogue., Engaged in cognitive restructuring/ reframing, looked at common cognitive distortions and challenged negative thoughts., Discussed and practiced mindfulness.    Has a specific means been identified for suicidal.homicide actions: Yes  If yes, describe: pt had aborted attempt via standing on a bridge, but did not jump off  Explain action steps toward mitigation: Unable to restrict access to bridges completely, due to their presence in communities/roads. Would recommend increased supervision by support network.  Document completion of mitigation action: None at this time.  The follow up action still needed prior to discharge: Would recommend involving family with safety/discharge planning. Family would like to be involved.    Patient coping skills attempted to reduce the crisis:  help-seeking       Imminent risk of harm: Suicidal Behavior  Severe psychiatric, behavioral or other comorbid conditions are appropriate for management at inpatient mental health as indicated by at least one of the following: Psychiatric Symptoms, Impaired impulse control, judgement, or insight  Severe dysfunction in daily living is present as indicated by at least one of the following: Extreme deterioration in social interactions, Complete inability to maintain any appropriate aspect of personal responsibility in any adult roles, Other evidence of severe dysfunction  Situation and expectations are appropriate for inpatient care: Patient management/treatment at lower level of care is not feasible or is inappropriate, Biopsychosocial stresses potentially contributing to clinical presentation (co morbidities) have been assessed and are absent or manageable at proposed level of care  Inpatient mental health services are  necessary to meet patient needs and at least one of the following: Specific condition related to admission diagnosis is present and judged likely to further improve at proposed level of care, Specific condition related to admission diagnosis is present and judged likely to deteriorate in absence of treatment at proposed level of care      Collateral contact information:  ISSA SANCHES (Father) -- -- [phone]    Legal Status: Voluntary/Patient has signed consent for treatment                                                                                                                                 Reviewed court records: yes     Psychiatry Consult:     Naheed Hawkins LPC

## 2025-03-17 NOTE — ED PROVIDER NOTES
"[facility] EMERGENCY DEPARTMENT   ED Mental Health Observation - Daily Note for 3/17/2025    Stewart Moreria is a 19 year old female currently boarding in the ED while awaiting placement for Mental health crisis.  Please see the initial H&P for this patient's presentation, workup, and disposition plan.     Hold Status:  Patient is Voluntary, but holdable    Plan:  In brief, the patient's presentation is notable for suicidal ideation.  Patient is awaiting Mental health placement    Interim History:  There were no significant events since last note.    Physical Exam:  /58 (BP Location: Left arm)   Pulse (!) 46   Temp 97.9  F (36.6  C) (Oral)   Resp 18   Ht 1.626 m (5' 4\")   Wt 77.1 kg (170 lb)   LMP 01/13/2025 (Approximate)   SpO2 99%   BMI 29.18 kg/m    No respiratory distress, on room air   Well perfused  Behavior appropriate    Medications provided prior to my care:  Medications - No data to display    Laboratory (reviewed and interpreted):  Labs Ordered and Resulted from Time of ED Arrival to Time of ED Departure   COMPREHENSIVE METABOLIC PANEL - Normal       Result Value    Sodium 139      Potassium 3.7      Carbon Dioxide (CO2) 23      Anion Gap 11      Urea Nitrogen 9.6      Creatinine 0.84      GFR Estimate >90      Calcium 9.5      Chloride 105      Glucose 89      Alkaline Phosphatase 89      AST 24      ALT 22      Protein Total 7.7      Albumin 4.3      Bilirubin Total 0.2     HCG QUALITATIVE PREGNANCY - Normal    hCG Serum Qualitative Negative     INFLUENZA A/B, RSV AND SARS-COV2 PCR - Normal    Influenza A PCR Negative      Influenza B PCR Negative      RSV PCR Negative      SARS CoV2 PCR Negative     URINE DRUG SCREEN PANEL - Normal    Amphetamines Urine Screen Negative      Barbituates Urine Screen Negative      Benzodiazepine Urine Screen Negative      Cannabinoids Urine Screen Negative      Cocaine Urine Screen Negative      Fentanyl Qual Urine Screen Negative "      Opiates Urine Screen Negative      PCP Urine Screen Negative     CBC WITH PLATELETS AND DIFFERENTIAL    WBC Count 5.4      RBC Count 4.69      Hemoglobin 13.3      Hematocrit 39.7      MCV 85      MCH 28.4      MCHC 33.5      RDW 12.8      Platelet Count 267      % Neutrophils 50      % Lymphocytes 42      % Monocytes 5      % Eosinophils 2      % Basophils 1      % Immature Granulocytes 0      NRBCs per 100 WBC 0      Absolute Neutrophils 2.7      Absolute Lymphocytes 2.2      Absolute Monocytes 0.3      Absolute Eosinophils 0.1      Absolute Basophils 0.1      Absolute Immature Granulocytes 0.0      Absolute NRBCs 0.0         ED Course:  7:08 AM.  Spoke to the nurse taking care of the patient overnight.  Patient bili quiet overnight.  Patient remains voluntary but holdable and they are looking for DEC placement.    Impression/Plan:  1. Depression, unspecified depression type    2. Suicidal ideation        MD Patience Green Daniel, MD  03/17/25 0709

## 2025-03-17 NOTE — ED NOTES
Called report to MARTÍN Parham at [facility] and called for EMS transport with an ETA of noon.

## 2025-03-17 NOTE — ED PROVIDER NOTES
[facility] EMERGENCY DEPARTMENT   ED Mental Health Observation - Initiation Note    Stewart Moreira was placed into observation at 10:37 PM on 3/16/2025 for Mental health crisis.   Patient is expected to be under observation status for a minimum of eight hours.    DO Keri Moctezuma Paul, DO  03/16/25 2554